# Patient Record
Sex: FEMALE | Race: BLACK OR AFRICAN AMERICAN | NOT HISPANIC OR LATINO | Employment: OTHER | URBAN - METROPOLITAN AREA
[De-identification: names, ages, dates, MRNs, and addresses within clinical notes are randomized per-mention and may not be internally consistent; named-entity substitution may affect disease eponyms.]

---

## 2019-10-08 ENCOUNTER — TELEPHONE (OUTPATIENT)
Dept: GASTROENTEROLOGY | Facility: CLINIC | Age: 69
End: 2019-10-08

## 2023-01-21 ENCOUNTER — HOSPITAL ENCOUNTER (EMERGENCY)
Facility: HOSPITAL | Age: 73
Discharge: HOME | End: 2023-01-21
Attending: EMERGENCY MEDICINE
Payer: MEDICARE

## 2023-01-21 VITALS
DIASTOLIC BLOOD PRESSURE: 76 MMHG | HEIGHT: 63 IN | WEIGHT: 293 LBS | HEART RATE: 66 BPM | SYSTOLIC BLOOD PRESSURE: 132 MMHG | TEMPERATURE: 99 F | OXYGEN SATURATION: 98 % | RESPIRATION RATE: 20 BRPM | BODY MASS INDEX: 51.91 KG/M2

## 2023-01-21 DIAGNOSIS — M79.673 FOOT PAIN: ICD-10-CM

## 2023-01-21 DIAGNOSIS — M25.569 KNEE PAIN: ICD-10-CM

## 2023-01-21 DIAGNOSIS — M47.816 OSTEOARTHRITIS OF LUMBAR SPINE, UNSPECIFIED SPINAL OSTEOARTHRITIS COMPLICATION STATUS: ICD-10-CM

## 2023-01-21 DIAGNOSIS — M17.11 OSTEOARTHRITIS OF RIGHT KNEE, UNSPECIFIED OSTEOARTHRITIS TYPE: ICD-10-CM

## 2023-01-21 DIAGNOSIS — M54.50 LOW BACK PAIN WITHOUT SCIATICA, UNSPECIFIED BACK PAIN LATERALITY, UNSPECIFIED CHRONICITY: Primary | ICD-10-CM

## 2023-01-21 PROCEDURE — 25000003 PHARM REV CODE 250: Mod: ER | Performed by: EMERGENCY MEDICINE

## 2023-01-21 PROCEDURE — 99284 EMERGENCY DEPT VISIT MOD MDM: CPT | Mod: ER

## 2023-01-21 RX ORDER — TRAMADOL HYDROCHLORIDE 50 MG/1
50 TABLET ORAL EVERY 8 HOURS PRN
Qty: 12 TABLET | Refills: 0 | Status: SHIPPED | OUTPATIENT
Start: 2023-01-21

## 2023-01-21 RX ORDER — ACETAMINOPHEN 500 MG
1000 TABLET ORAL
Status: COMPLETED | OUTPATIENT
Start: 2023-01-21 | End: 2023-01-21

## 2023-01-21 RX ADMIN — ACETAMINOPHEN 1000 MG: 500 TABLET, FILM COATED ORAL at 03:01

## 2023-01-22 NOTE — ED PROVIDER NOTES
Encounter Date: 1/21/2023       History     Chief Complaint   Patient presents with    Fall     Pt presents to the ED with C/C of fall. Pt reports R knee, foot, and back pain. Pt denies LOC or hittinf her head, is not on blood thinners. Pt has a pmhx of HTN     72-year-old female who is not keen on her past medical history who and chart review shows little states she had a mechanical slip and fall.  She states that her right knee chronically hurts her and gives out on her causing her to fall.  Denies being on blood thinners.  Denies hitting her head.  No loss conscious.  No neck pain.  She is complaining of right foot and low back pain.  These are also chronic but mildly exacerbated from the fall.  She was able to walk.  She walks slowly and uses a cane.  States she only takes Tylenol for pain but not taking Tylenol today.     Review of patient's allergies indicates:  No Known Allergies  No past medical history on file.  No past surgical history on file.  No family history on file.     Review of Systems   Constitutional:  Negative for fever.   HENT:  Negative for sore throat.    Eyes:  Negative for visual disturbance.   Respiratory:  Negative for shortness of breath.    Cardiovascular:  Negative for chest pain.   Gastrointestinal:  Negative for abdominal pain, nausea and vomiting.   Genitourinary:  Negative for difficulty urinating.   Musculoskeletal:  Positive for arthralgias and back pain. Negative for neck pain.   Skin:  Negative for rash.   Neurological:  Negative for headaches.     Physical Exam     Initial Vitals [01/21/23 1300]   BP Pulse Resp Temp SpO2   129/73 65 18 99 °F (37.2 °C) 98 %      MAP       --         Physical Exam    Nursing note and vitals reviewed.  Constitutional: She appears well-developed and well-nourished.   Eyes: EOM are normal. Pupils are equal, round, and reactive to light.   Neck: Neck supple. No thyromegaly present. No JVD present.   Normal range of motion.  Cardiovascular:  Normal  rate, regular rhythm, normal heart sounds and intact distal pulses.     Exam reveals no gallop and no friction rub.       No murmur heard.  Pulmonary/Chest: Breath sounds normal. No respiratory distress.   Abdominal: Abdomen is soft. Bowel sounds are normal.   Musculoskeletal:         General: No tenderness or edema. Normal range of motion.      Cervical back: Normal range of motion and neck supple.      Comments: Patient has significant arthritic changes to her knees right greater than left.  There is cogwheeling and rashing to the right knee.  No acute swelling, erythema, instability.  Patient is developing a Charcot deformity to her right foot.  This appears chronic.  There is callus to the fallen arch.  No acute swelling or bruising.  No midline back tenderness.  Patient appears to have full range of motion.     Neurological: She is alert and oriented to person, place, and time. She has normal strength. GCS score is 15. GCS eye subscore is 4. GCS verbal subscore is 5. GCS motor subscore is 6.   Patient has a left-sided facial droop and mild dysarthria.  She states this is chronic and unchanged however denies history of stroke.  She denies any new neurologic symptomatology.  Highly recommended to her that she have this followed up further by primary care to start.   Skin: Skin is warm and dry.       ED Course   Procedures  Labs Reviewed - No data to display       Imaging Results              X-Ray Knee 3 View Right (Final result)  Result time 01/21/23 14:58:23      Final result by Brian Ahmadi DO (01/21/23 14:58:23)                   Impression:      Please see above      Electronically signed by: Brian Ahmadi DO  Date:    01/21/2023  Time:    14:58               Narrative:    EXAMINATION:  XR KNEE 3 VIEW RIGHT    CLINICAL HISTORY:  Pain in unspecified knee    TECHNIQUE:  AP, lateral, and Merchant views of the right knee were performed.    COMPARISON:  None    FINDINGS:  Tricompartmental degenerative change  with joint space loss articular sclerosis and marginal osteophyte formation most pronounced in the femoral tibial compartments with advanced joint space loss.  There is no evidence for acute fracture line or dislocation right knee.  Questionable small suprapatellar bursa effusion.  Further evaluation as warranted clinically.                                       X-Ray Foot Complete Right (Final result)  Result time 01/21/23 15:00:23      Final result by Fracisco Jerome MD (01/21/23 15:00:23)                   Impression:      1. No acute displaced fracture or dislocation of the foot.      Electronically signed by: Fracisco Jerome MD  Date:    01/21/2023  Time:    15:00               Narrative:    EXAMINATION:  XR FOOT COMPLETE 3 VIEW RIGHT    CLINICAL HISTORY:  . Pain in unspecified foot    TECHNIQUE:  AP, lateral, and oblique views of the right foot were performed.    COMPARISON:  None    FINDINGS:  Three views right foot.    There is osteopenia.  There are degenerative changes of the foot.  No convincing acute displaced fracture or dislocation of the foot.  No radiopaque foreign body.                                       X-Ray Lumbar Spine Ap And Lateral (Final result)  Result time 01/21/23 14:59:49      Final result by Brian Ahmadi DO (01/21/23 14:59:49)                   Impression:      Please see above      Electronically signed by: Brian Ahmadi DO  Date:    01/21/2023  Time:    14:59               Narrative:    EXAMINATION:  XR LUMBAR SPINE AP AND LATERAL    CLINICAL HISTORY:  back pain;    TECHNIQUE:  AP, lateral and spot images were performed of the lumbar spine.    COMPARISON:  None    FINDINGS:  There is poor visualization L5 and S1 likely related to oblique positioning and overlapping structures allowing for this the lumbar sagittal alignment is within normal limits.  The visualized lumbar vertebral body heights and contours are within normal is allowing for endplate degeneration without  evidence for definite acute fracture or subluxation.  Prominent vascular calcifications and ectasia of the aorta.  Multiple surgical clips in the upper abdomen bilaterally.  Prominent gas and fecal material projected over the visualized colon and rectum.  Further evaluation as warranted clinically.                                       Medications   acetaminophen tablet 1,000 mg (1,000 mg Oral Given 1/21/23 1529)   X-rays to these areas shows severe degeneration and osteoarthritis without acute abnormalities.  Will refer to orthopedics.  Will also refer to primary care as for regular physicals and full medical evaluation.                           Clinical Impression:   Final diagnoses:  [M25.569] Knee pain  [M79.673] Foot pain  [M54.50] Low back pain without sciatica, unspecified back pain laterality, unspecified chronicity (Primary)  [M17.11] Osteoarthritis of right knee, unspecified osteoarthritis type  [M47.816] Osteoarthritis of lumbar spine, unspecified spinal osteoarthritis complication status        ED Disposition Condition    Discharge Stable          ED Prescriptions       Medication Sig Dispense Start Date End Date Auth. Provider    traMADoL (ULTRAM) 50 mg tablet Take 1 tablet (50 mg total) by mouth every 8 (eight) hours as needed (breakthrough pain). 12 tablet 1/21/2023 -- Eran Egan MD          Follow-up Information       Follow up With Specialties Details Why Contact Info    Gerald Silver MD Family Medicine, Wound Care Schedule an appointment as soon as possible for a visit  or your primary care doctor. [address] [facility] [address] [phone]      Michel Coffey MD Orthopedic Surgery Schedule an appointment as soon as possible for a visit  for knee and back evaluation. [address]  [facility] [address] [phone]               Eran Egan MD  01/22/23 0784

## 2025-01-12 ENCOUNTER — HOSPITAL ENCOUNTER (INPATIENT)
Facility: HOSPITAL | Age: 75
LOS: 4 days | Discharge: HOME | DRG: 871 | End: 2025-01-16
Attending: STUDENT IN AN ORGANIZED HEALTH CARE EDUCATION/TRAINING PROGRAM | Admitting: HOSPITALIST
Payer: MEDICARE

## 2025-01-12 DIAGNOSIS — R05.9 COUGH, UNSPECIFIED TYPE: ICD-10-CM

## 2025-01-12 DIAGNOSIS — J18.9 COMMUNITY ACQUIRED PNEUMONIA, UNSPECIFIED LATERALITY: Primary | ICD-10-CM

## 2025-01-12 DIAGNOSIS — A41.9 SEPSIS, DUE TO UNSPECIFIED ORGANISM, UNSPECIFIED WHETHER ACUTE ORGAN DYSFUNCTION PRESENT: ICD-10-CM

## 2025-01-12 DIAGNOSIS — I48.91 ATRIAL FIBRILLATION, UNSPECIFIED TYPE: ICD-10-CM

## 2025-01-12 DIAGNOSIS — R09.02 HYPOXIA: ICD-10-CM

## 2025-01-12 DIAGNOSIS — R06.02 SHORTNESS OF BREATH: ICD-10-CM

## 2025-01-12 DIAGNOSIS — J96.01 ACUTE HYPOXIC RESPIRATORY FAILURE: ICD-10-CM

## 2025-01-12 PROBLEM — D64.9 ANEMIA: Status: ACTIVE | Noted: 2025-01-12

## 2025-01-12 PROBLEM — E87.1 HYPONATREMIA: Status: ACTIVE | Noted: 2025-01-12

## 2025-01-12 PROBLEM — I10 BENIGN ESSENTIAL HYPERTENSION: Chronic | Status: ACTIVE | Noted: 2025-01-12

## 2025-01-12 PROBLEM — E78.5 HYPERLIPIDEMIA: Chronic | Status: ACTIVE | Noted: 2025-01-12

## 2025-01-12 LAB
ALBUMIN SERPL BCP-MCNC: 2.7 G/DL (ref 3.5–5.2)
ALLENS TEST: ABNORMAL
ALP SERPL-CCNC: 215 U/L (ref 40–150)
ALT SERPL W/O P-5'-P-CCNC: 45 U/L (ref 10–44)
ANION GAP SERPL CALC-SCNC: 11 MMOL/L (ref 8–16)
AST SERPL-CCNC: 43 U/L (ref 10–40)
BASOPHILS # BLD AUTO: 0.07 K/UL (ref 0–0.2)
BASOPHILS NFR BLD: 0.4 % (ref 0–1.9)
BILIRUB SERPL-MCNC: 0.4 MG/DL (ref 0.1–1)
BILIRUB UR QL STRIP: NEGATIVE
BNP SERPL-MCNC: 304 PG/ML (ref 0–99)
BUN SERPL-MCNC: 18 MG/DL (ref 8–23)
CALCIUM SERPL-MCNC: 8.7 MG/DL (ref 8.7–10.5)
CHLORIDE SERPL-SCNC: 97 MMOL/L (ref 95–110)
CLARITY UR: CLEAR
CO2 SERPL-SCNC: 23 MMOL/L (ref 23–29)
COLOR UR: YELLOW
CREAT SERPL-MCNC: 0.8 MG/DL (ref 0.5–1.4)
DELSYS: ABNORMAL
DIFFERENTIAL METHOD BLD: ABNORMAL
EOSINOPHIL # BLD AUTO: 0.1 K/UL (ref 0–0.5)
EOSINOPHIL NFR BLD: 0.3 % (ref 0–8)
ERYTHROCYTE [DISTWIDTH] IN BLOOD BY AUTOMATED COUNT: 12.9 % (ref 11.5–14.5)
EST. GFR  (NO RACE VARIABLE): >60 ML/MIN/1.73 M^2
FLOW: 2
GLUCOSE SERPL-MCNC: 142 MG/DL (ref 70–110)
GLUCOSE UR QL STRIP: NEGATIVE
HCO3 UR-SCNC: 30.1 MMOL/L (ref 24–28)
HCT VFR BLD AUTO: 29.7 % (ref 37–48.5)
HGB BLD-MCNC: 9.6 G/DL (ref 12–16)
HGB UR QL STRIP: NEGATIVE
IMM GRANULOCYTES # BLD AUTO: 0.19 K/UL (ref 0–0.04)
IMM GRANULOCYTES NFR BLD AUTO: 1 % (ref 0–0.5)
KETONES UR QL STRIP: NEGATIVE
LACTATE SERPL-SCNC: 1.1 MMOL/L (ref 0.5–2.2)
LEUKOCYTE ESTERASE UR QL STRIP: NEGATIVE
LYMPHOCYTES # BLD AUTO: 1.2 K/UL (ref 1–4.8)
LYMPHOCYTES NFR BLD: 6 % (ref 18–48)
MAGNESIUM SERPL-MCNC: 2.1 MG/DL (ref 1.6–2.6)
MCH RBC QN AUTO: 28.2 PG (ref 27–31)
MCHC RBC AUTO-ENTMCNC: 32.3 G/DL (ref 32–36)
MCV RBC AUTO: 87 FL (ref 82–98)
MODE: ABNORMAL
MONOCYTES # BLD AUTO: 1.1 K/UL (ref 0.3–1)
MONOCYTES NFR BLD: 5.6 % (ref 4–15)
NEUTROPHILS # BLD AUTO: 16.7 K/UL (ref 1.8–7.7)
NEUTROPHILS NFR BLD: 86.7 % (ref 38–73)
NITRITE UR QL STRIP: NEGATIVE
NRBC BLD-RTO: 0 /100 WBC
OHS QRS DURATION: 94 MS
OHS QTC CALCULATION: 472 MS
PCO2 BLDA: 49.5 MMHG (ref 35–45)
PH SMN: 7.39 [PH] (ref 7.35–7.45)
PH UR STRIP: 7 [PH] (ref 5–8)
PLATELET # BLD AUTO: 273 K/UL (ref 150–450)
PMV BLD AUTO: 9.9 FL (ref 9.2–12.9)
PO2 BLDA: 35 MMHG (ref 40–60)
POC BE: 4 MMOL/L
POC SATURATED O2: 65 % (ref 95–100)
POC TCO2: 32 MMOL/L (ref 24–29)
POCT GLUCOSE: 137 MG/DL (ref 70–110)
POCT GLUCOSE: 146 MG/DL (ref 70–110)
POTASSIUM SERPL-SCNC: 4.5 MMOL/L (ref 3.5–5.1)
PROT SERPL-MCNC: 7.6 G/DL (ref 6–8.4)
PROT UR QL STRIP: NEGATIVE
RBC # BLD AUTO: 3.4 M/UL (ref 4–5.4)
RSV AG SPEC QL IA: NEGATIVE
SAMPLE: ABNORMAL
SITE: ABNORMAL
SODIUM SERPL-SCNC: 131 MMOL/L (ref 136–145)
SP GR UR STRIP: 1.01 (ref 1–1.03)
SPECIMEN SOURCE: NORMAL
TROPONIN I SERPL DL<=0.01 NG/ML-MCNC: 0.02 NG/ML (ref 0–0.03)
URN SPEC COLLECT METH UR: NORMAL
UROBILINOGEN UR STRIP-ACNC: NEGATIVE EU/DL
WBC # BLD AUTO: 19.26 K/UL (ref 3.9–12.7)

## 2025-01-12 PROCEDURE — 87634 RSV DNA/RNA AMP PROBE: CPT | Performed by: PHYSICIAN ASSISTANT

## 2025-01-12 PROCEDURE — 82803 BLOOD GASES ANY COMBINATION: CPT

## 2025-01-12 PROCEDURE — 63600175 PHARM REV CODE 636 W HCPCS: Performed by: EMERGENCY MEDICINE

## 2025-01-12 PROCEDURE — 93005 ELECTROCARDIOGRAM TRACING: CPT

## 2025-01-12 PROCEDURE — 87040 BLOOD CULTURE FOR BACTERIA: CPT | Performed by: PHYSICIAN ASSISTANT

## 2025-01-12 PROCEDURE — 25000003 PHARM REV CODE 250: Performed by: PHYSICIAN ASSISTANT

## 2025-01-12 PROCEDURE — 21400001 HC TELEMETRY ROOM

## 2025-01-12 PROCEDURE — 81003 URINALYSIS AUTO W/O SCOPE: CPT | Performed by: PHYSICIAN ASSISTANT

## 2025-01-12 PROCEDURE — 63600175 PHARM REV CODE 636 W HCPCS: Performed by: HOSPITALIST

## 2025-01-12 PROCEDURE — 83605 ASSAY OF LACTIC ACID: CPT | Performed by: PHYSICIAN ASSISTANT

## 2025-01-12 PROCEDURE — 85025 COMPLETE CBC W/AUTO DIFF WBC: CPT | Performed by: PHYSICIAN ASSISTANT

## 2025-01-12 PROCEDURE — 25000003 PHARM REV CODE 250: Performed by: HOSPITALIST

## 2025-01-12 PROCEDURE — 11000001 HC ACUTE MED/SURG PRIVATE ROOM

## 2025-01-12 PROCEDURE — 94640 AIRWAY INHALATION TREATMENT: CPT

## 2025-01-12 PROCEDURE — 83880 ASSAY OF NATRIURETIC PEPTIDE: CPT | Performed by: PHYSICIAN ASSISTANT

## 2025-01-12 PROCEDURE — 93010 ELECTROCARDIOGRAM REPORT: CPT | Mod: ,,, | Performed by: INTERNAL MEDICINE

## 2025-01-12 PROCEDURE — 82962 GLUCOSE BLOOD TEST: CPT

## 2025-01-12 PROCEDURE — 83735 ASSAY OF MAGNESIUM: CPT | Performed by: PHYSICIAN ASSISTANT

## 2025-01-12 PROCEDURE — 63600175 PHARM REV CODE 636 W HCPCS: Performed by: PHYSICIAN ASSISTANT

## 2025-01-12 PROCEDURE — 80053 COMPREHEN METABOLIC PANEL: CPT | Performed by: PHYSICIAN ASSISTANT

## 2025-01-12 PROCEDURE — 99900035 HC TECH TIME PER 15 MIN (STAT)

## 2025-01-12 PROCEDURE — 87070 CULTURE OTHR SPECIMN AEROBIC: CPT | Performed by: HOSPITALIST

## 2025-01-12 PROCEDURE — 96365 THER/PROPH/DIAG IV INF INIT: CPT

## 2025-01-12 PROCEDURE — 96375 TX/PRO/DX INJ NEW DRUG ADDON: CPT

## 2025-01-12 PROCEDURE — 87205 SMEAR GRAM STAIN: CPT | Performed by: HOSPITALIST

## 2025-01-12 PROCEDURE — 99285 EMERGENCY DEPT VISIT HI MDM: CPT | Mod: 25

## 2025-01-12 PROCEDURE — 25500020 PHARM REV CODE 255: Performed by: STUDENT IN AN ORGANIZED HEALTH CARE EDUCATION/TRAINING PROGRAM

## 2025-01-12 PROCEDURE — 82800 BLOOD PH: CPT

## 2025-01-12 PROCEDURE — 25000003 PHARM REV CODE 250: Performed by: INTERNAL MEDICINE

## 2025-01-12 PROCEDURE — 84484 ASSAY OF TROPONIN QUANT: CPT | Performed by: PHYSICIAN ASSISTANT

## 2025-01-12 PROCEDURE — 25000003 PHARM REV CODE 250: Performed by: EMERGENCY MEDICINE

## 2025-01-12 PROCEDURE — 25000242 PHARM REV CODE 250 ALT 637 W/ HCPCS: Performed by: PHYSICIAN ASSISTANT

## 2025-01-12 RX ORDER — ACETAMINOPHEN 325 MG/1
650 TABLET ORAL EVERY 4 HOURS PRN
Status: DISCONTINUED | OUTPATIENT
Start: 2025-01-12 | End: 2025-01-16 | Stop reason: HOSPADM

## 2025-01-12 RX ORDER — IPRATROPIUM BROMIDE AND ALBUTEROL SULFATE 2.5; .5 MG/3ML; MG/3ML
3 SOLUTION RESPIRATORY (INHALATION) ONCE
Status: COMPLETED | OUTPATIENT
Start: 2025-01-12 | End: 2025-01-12

## 2025-01-12 RX ORDER — CEFTRIAXONE 2 G/1
2 INJECTION, POWDER, FOR SOLUTION INTRAMUSCULAR; INTRAVENOUS
Status: DISCONTINUED | OUTPATIENT
Start: 2025-01-12 | End: 2025-01-16 | Stop reason: HOSPADM

## 2025-01-12 RX ORDER — IPRATROPIUM BROMIDE AND ALBUTEROL SULFATE 2.5; .5 MG/3ML; MG/3ML
3 SOLUTION RESPIRATORY (INHALATION) EVERY 4 HOURS PRN
Status: DISCONTINUED | OUTPATIENT
Start: 2025-01-12 | End: 2025-01-14

## 2025-01-12 RX ORDER — BENZONATATE 100 MG/1
200 CAPSULE ORAL 3 TIMES DAILY PRN
Status: DISCONTINUED | OUTPATIENT
Start: 2025-01-12 | End: 2025-01-16 | Stop reason: HOSPADM

## 2025-01-12 RX ORDER — OLMESARTAN MEDOXOMIL 40 MG/1
40 TABLET ORAL DAILY
COMMUNITY

## 2025-01-12 RX ORDER — HYDROCHLOROTHIAZIDE 25 MG/1
25 TABLET ORAL DAILY
COMMUNITY

## 2025-01-12 RX ORDER — NIFEDIPINE 30 MG/1
30 TABLET, EXTENDED RELEASE ORAL DAILY
Status: DISCONTINUED | OUTPATIENT
Start: 2025-01-12 | End: 2025-01-16 | Stop reason: HOSPADM

## 2025-01-12 RX ORDER — SODIUM CHLORIDE 0.9 % (FLUSH) 0.9 %
10 SYRINGE (ML) INJECTION EVERY 12 HOURS PRN
Status: DISCONTINUED | OUTPATIENT
Start: 2025-01-12 | End: 2025-01-16 | Stop reason: HOSPADM

## 2025-01-12 RX ORDER — GUAIFENESIN AND DEXTROMETHORPHAN HYDROBROMIDE 10; 100 MG/5ML; MG/5ML
5 SYRUP ORAL EVERY 6 HOURS
Status: DISCONTINUED | OUTPATIENT
Start: 2025-01-12 | End: 2025-01-16 | Stop reason: HOSPADM

## 2025-01-12 RX ORDER — ASPIRIN 81 MG/1
81 TABLET ORAL DAILY
Status: DISCONTINUED | OUTPATIENT
Start: 2025-01-12 | End: 2025-01-16 | Stop reason: HOSPADM

## 2025-01-12 RX ORDER — ONDANSETRON HYDROCHLORIDE 2 MG/ML
8 INJECTION, SOLUTION INTRAVENOUS EVERY 6 HOURS PRN
Status: DISCONTINUED | OUTPATIENT
Start: 2025-01-12 | End: 2025-01-16 | Stop reason: HOSPADM

## 2025-01-12 RX ORDER — TRAMADOL HYDROCHLORIDE 50 MG/1
50 TABLET ORAL EVERY 8 HOURS PRN
Status: DISCONTINUED | OUTPATIENT
Start: 2025-01-12 | End: 2025-01-16 | Stop reason: HOSPADM

## 2025-01-12 RX ORDER — CARVEDILOL 12.5 MG/1
25 TABLET ORAL 2 TIMES DAILY WITH MEALS
Status: DISCONTINUED | OUTPATIENT
Start: 2025-01-12 | End: 2025-01-16 | Stop reason: HOSPADM

## 2025-01-12 RX ORDER — PROCHLORPERAZINE EDISYLATE 5 MG/ML
5 INJECTION INTRAMUSCULAR; INTRAVENOUS EVERY 6 HOURS PRN
Status: DISCONTINUED | OUTPATIENT
Start: 2025-01-12 | End: 2025-01-16 | Stop reason: HOSPADM

## 2025-01-12 RX ORDER — VALSARTAN 80 MG/1
160 TABLET ORAL DAILY
Status: DISCONTINUED | OUTPATIENT
Start: 2025-01-12 | End: 2025-01-16 | Stop reason: HOSPADM

## 2025-01-12 RX ORDER — HYDRALAZINE HYDROCHLORIDE 20 MG/ML
10 INJECTION INTRAMUSCULAR; INTRAVENOUS EVERY 8 HOURS PRN
Status: DISCONTINUED | OUTPATIENT
Start: 2025-01-12 | End: 2025-01-16 | Stop reason: HOSPADM

## 2025-01-12 RX ORDER — TALC
6 POWDER (GRAM) TOPICAL NIGHTLY PRN
Status: DISCONTINUED | OUTPATIENT
Start: 2025-01-12 | End: 2025-01-16 | Stop reason: HOSPADM

## 2025-01-12 RX ORDER — POTASSIUM CHLORIDE 750 MG/1
10 TABLET, EXTENDED RELEASE ORAL ONCE
COMMUNITY

## 2025-01-12 RX ORDER — POLYETHYLENE GLYCOL 3350 17 G/17G
17 POWDER, FOR SOLUTION ORAL 2 TIMES DAILY PRN
Status: DISCONTINUED | OUTPATIENT
Start: 2025-01-12 | End: 2025-01-16 | Stop reason: HOSPADM

## 2025-01-12 RX ORDER — HYDROCODONE BITARTRATE AND ACETAMINOPHEN 5; 325 MG/1; MG/1
1 TABLET ORAL EVERY 6 HOURS PRN
Status: DISCONTINUED | OUTPATIENT
Start: 2025-01-12 | End: 2025-01-12

## 2025-01-12 RX ORDER — LEVOFLOXACIN 5 MG/ML
750 INJECTION, SOLUTION INTRAVENOUS
Status: COMPLETED | OUTPATIENT
Start: 2025-01-12 | End: 2025-01-12

## 2025-01-12 RX ORDER — NIFEDIPINE 90 MG/1
30 TABLET, EXTENDED RELEASE ORAL DAILY
COMMUNITY

## 2025-01-12 RX ORDER — ZIPRASIDONE HYDROCHLORIDE 20 MG/1
80 CAPSULE ORAL NIGHTLY
Status: DISCONTINUED | OUTPATIENT
Start: 2025-01-12 | End: 2025-01-16 | Stop reason: HOSPADM

## 2025-01-12 RX ORDER — ASPIRIN 81 MG/1
81 TABLET ORAL DAILY
COMMUNITY

## 2025-01-12 RX ORDER — ENOXAPARIN SODIUM 100 MG/ML
40 INJECTION SUBCUTANEOUS EVERY 24 HOURS
Status: DISCONTINUED | OUTPATIENT
Start: 2025-01-12 | End: 2025-01-16 | Stop reason: HOSPADM

## 2025-01-12 RX ORDER — CARVEDILOL 25 MG/1
25 TABLET ORAL 2 TIMES DAILY WITH MEALS
COMMUNITY

## 2025-01-12 RX ADMIN — AZITHROMYCIN DIHYDRATE 500 MG: 500 INJECTION, POWDER, LYOPHILIZED, FOR SOLUTION INTRAVENOUS at 01:01

## 2025-01-12 RX ADMIN — GUAIFENESIN AND DEXTROMETHORPHAN 5 ML: 100; 10 SYRUP ORAL at 11:01

## 2025-01-12 RX ADMIN — IOHEXOL 75 ML: 350 INJECTION, SOLUTION INTRAVENOUS at 07:01

## 2025-01-12 RX ADMIN — VALSARTAN 160 MG: 80 TABLET, FILM COATED ORAL at 10:01

## 2025-01-12 RX ADMIN — ENOXAPARIN SODIUM 40 MG: 40 INJECTION SUBCUTANEOUS at 04:01

## 2025-01-12 RX ADMIN — SODIUM CHLORIDE, POTASSIUM CHLORIDE, SODIUM LACTATE AND CALCIUM CHLORIDE 500 ML: 600; 310; 30; 20 INJECTION, SOLUTION INTRAVENOUS at 06:01

## 2025-01-12 RX ADMIN — IPRATROPIUM BROMIDE AND ALBUTEROL SULFATE 3 ML: 2.5; .5 SOLUTION RESPIRATORY (INHALATION) at 06:01

## 2025-01-12 RX ADMIN — ASPIRIN 81 MG: 81 TABLET, COATED ORAL at 10:01

## 2025-01-12 RX ADMIN — LEVOFLOXACIN 750 MG: 750 INJECTION, SOLUTION INTRAVENOUS at 08:01

## 2025-01-12 RX ADMIN — VANCOMYCIN HYDROCHLORIDE 1750 MG: 500 INJECTION, POWDER, LYOPHILIZED, FOR SOLUTION INTRAVENOUS at 09:01

## 2025-01-12 RX ADMIN — CARVEDILOL 25 MG: 12.5 TABLET, FILM COATED ORAL at 04:01

## 2025-01-12 RX ADMIN — GUAIFENESIN AND DEXTROMETHORPHAN 5 ML: 100; 10 SYRUP ORAL at 05:01

## 2025-01-12 RX ADMIN — NIFEDIPINE 30 MG: 30 TABLET, FILM COATED, EXTENDED RELEASE ORAL at 10:01

## 2025-01-12 RX ADMIN — GUAIFENESIN AND DEXTROMETHORPHAN 5 ML: 100; 10 SYRUP ORAL at 01:01

## 2025-01-12 RX ADMIN — PIPERACILLIN SODIUM AND TAZOBACTAM SODIUM 4.5 G: 4; .5 INJECTION, POWDER, FOR SOLUTION INTRAVENOUS at 07:01

## 2025-01-12 RX ADMIN — CEFTRIAXONE 2 G: 2 INJECTION, POWDER, FOR SOLUTION INTRAMUSCULAR; INTRAVENOUS at 01:01

## 2025-01-12 RX ADMIN — ZIPRASIDONE HYDROCHLORIDE 80 MG: 20 CAPSULE ORAL at 11:01

## 2025-01-12 NOTE — ASSESSMENT & PLAN NOTE
Hyponatremia is likely due to Dehydration/hypovolemia. The patient's most recent sodium results are listed below.  Recent Labs     01/12/25  0519   *     Plan  - Correct the sodium by 4-6mEq in 24 hours.   - Will treat the hyponatremia with IVF's  - Monitor sodium Daily.

## 2025-01-12 NOTE — SUBJECTIVE & OBJECTIVE
No past medical history on file.    No past surgical history on file.    Review of patient's allergies indicates:  No Known Allergies    No current facility-administered medications on file prior to encounter.     Current Outpatient Medications on File Prior to Encounter   Medication Sig    aspirin (ECOTRIN) 81 MG EC tablet Take 81 mg by mouth once daily.    carvediloL (COREG) 25 MG tablet Take 25 mg by mouth 2 (two) times daily with meals.    hydroCHLOROthiazide (HYDRODIURIL) 25 MG tablet Take 25 mg by mouth once daily.    NIFEdipine (ADALAT CC) 90 MG TbSR Take 30 mg by mouth once daily.    olmesartan (BENICAR) 40 MG tablet Take 40 mg by mouth once daily.    potassium chloride (KLOR-CON) 10 MEQ TbSR Take 10 mEq by mouth once.    traMADoL (ULTRAM) 50 mg tablet Take 1 tablet (50 mg total) by mouth every 8 (eight) hours as needed (breakthrough pain).     Family History    None       Tobacco Use    Smoking status: Not on file    Smokeless tobacco: Not on file   Substance and Sexual Activity    Alcohol use: Not on file    Drug use: Not on file    Sexual activity: Not on file     Review of Systems   Constitutional:  Positive for fatigue. Negative for fever.   HENT:  Negative for ear discharge and ear pain.    Eyes:  Negative for discharge and itching.   Respiratory:  Positive for cough and shortness of breath.    Cardiovascular:  Negative for chest pain and palpitations.   Gastrointestinal:  Positive for abdominal pain and diarrhea.   Endocrine: Negative for cold intolerance and heat intolerance.   Genitourinary:  Negative for difficulty urinating and hematuria.   Musculoskeletal:  Negative for neck pain and neck stiffness.   Skin:  Negative for rash and wound.   Neurological:  Negative for seizures and syncope.   Psychiatric/Behavioral:  Negative for agitation and hallucinations.      Objective:     Vital Signs (Most Recent):  Temp: 98.1 °F (36.7 °C) (01/12/25 1131)  Pulse: 85 (01/12/25 1131)  Resp: 18 (01/12/25  1131)  BP: (!) 165/72 (01/12/25 1131)  SpO2: (!) 90 % (01/12/25 1131) Vital Signs (24h Range):  Temp:  [98.1 °F (36.7 °C)-99 °F (37.2 °C)] 98.1 °F (36.7 °C)  Pulse:  [] 85  Resp:  [16-33] 18  SpO2:  [90 %-100 %] 90 %  BP: (123-165)/(72-77) 165/72     Weight: 91.5 kg (201 lb 11.5 oz)  Body mass index is 31.59 kg/m².     Physical Exam  Constitutional:       General: She is not in acute distress.     Appearance: She is ill-appearing.   HENT:      Head: Normocephalic and atraumatic.      Mouth/Throat:      Mouth: Mucous membranes are dry.      Pharynx: No oropharyngeal exudate or posterior oropharyngeal erythema.   Cardiovascular:      Rate and Rhythm: Regular rhythm. Tachycardia present.   Pulmonary:      Effort: Pulmonary effort is normal.      Breath sounds: Rales present.   Abdominal:      General: Bowel sounds are normal.      Palpations: Abdomen is soft.   Musculoskeletal:         General: No deformity or signs of injury.   Skin:     General: Skin is warm and dry.   Neurological:      General: No focal deficit present.      Mental Status: Mental status is at baseline.                Significant Labs: All pertinent labs within the past 24 hours have been reviewed.  BMP:   Recent Labs   Lab 01/12/25  0519   *   *   K 4.5   CL 97   CO2 23   BUN 18   CREATININE 0.8   CALCIUM 8.7   MG 2.1     CBC:   Recent Labs   Lab 01/12/25  0519   WBC 19.26*   HGB 9.6*   HCT 29.7*          Significant Imaging: I have reviewed all pertinent imaging results/findings within the past 24 hours.

## 2025-01-12 NOTE — H&P
[facility] Medicine  History & Physical    Patient Name: Vandana López  MRN: [id number]  Patient Class: IP- Inpatient  Admission Date: 1/12/2025  Attending Physician: Jose Goldsmith MD   Primary Care Provider: Alfreda Primary Doctor         Patient information was obtained from patient and ER records.     Subjective:     Principal Problem:Sepsis    Chief Complaint:   Chief Complaint   Patient presents with    Shortness of Breath     BIB [facility] for SOB that started sat, given duo neb by EMS and pts reports improvement.         HPI: 75 y/o female with past medical history of HTN, HLP presents with shortness of breath.  Patient complains of 3 days of shortness of breath with associated cough, congestion and wheezing.  She also complains of diarrhea.  States multiple grandchildren with URI symptoms, another one of her grand children with diarrhea.  She was experiencing some associated right-sided abdominal pain, which has resolved. Patient states decreased appetite and intake since onset of symptoms.  Some improvement of wheezing with inhaler, but no improvement of shortness of breath.  Dyspnea is both at rest and on exertion.  No alleviating factors.  Denies any fever or chills.  No other complaints.    No past medical history on file.    No past surgical history on file.    Review of patient's allergies indicates:  No Known Allergies    No current facility-administered medications on file prior to encounter.     Current Outpatient Medications on File Prior to Encounter   Medication Sig    aspirin (ECOTRIN) 81 MG EC tablet Take 81 mg by mouth once daily.    carvediloL (COREG) 25 MG tablet Take 25 mg by mouth 2 (two) times daily with meals.    hydroCHLOROthiazide (HYDRODIURIL) 25 MG tablet Take 25 mg by mouth once daily.    NIFEdipine (ADALAT CC) 90 MG TbSR Take 30 mg by mouth once daily.    olmesartan (BENICAR) 40 MG tablet Take 40 mg by mouth once daily.    potassium chloride (KLOR-CON) 10 MEQ  TbSR Take 10 mEq by mouth once.    traMADoL (ULTRAM) 50 mg tablet Take 1 tablet (50 mg total) by mouth every 8 (eight) hours as needed (breakthrough pain).     Family History    None       Tobacco Use    Smoking status: Not on file    Smokeless tobacco: Not on file   Substance and Sexual Activity    Alcohol use: Not on file    Drug use: Not on file    Sexual activity: Not on file     Review of Systems   Constitutional:  Positive for fatigue. Negative for fever.   HENT:  Negative for ear discharge and ear pain.    Eyes:  Negative for discharge and itching.   Respiratory:  Positive for cough and shortness of breath.    Cardiovascular:  Negative for chest pain and palpitations.   Gastrointestinal:  Positive for abdominal pain and diarrhea.   Endocrine: Negative for cold intolerance and heat intolerance.   Genitourinary:  Negative for difficulty urinating and hematuria.   Musculoskeletal:  Negative for neck pain and neck stiffness.   Skin:  Negative for rash and wound.   Neurological:  Negative for seizures and syncope.   Psychiatric/Behavioral:  Negative for agitation and hallucinations.      Objective:     Vital Signs (Most Recent):  Temp: 98.1 °F (36.7 °C) (01/12/25 1131)  Pulse: 85 (01/12/25 1131)  Resp: 18 (01/12/25 1131)  BP: (!) 165/72 (01/12/25 1131)  SpO2: (!) 90 % (01/12/25 1131) Vital Signs (24h Range):  Temp:  [98.1 °F (36.7 °C)-99 °F (37.2 °C)] 98.1 °F (36.7 °C)  Pulse:  [] 85  Resp:  [16-33] 18  SpO2:  [90 %-100 %] 90 %  BP: (123-165)/(72-77) 165/72     Weight: 91.5 kg (201 lb 11.5 oz)  Body mass index is 31.59 kg/m².     Physical Exam  Constitutional:       General: She is not in acute distress.     Appearance: She is ill-appearing.   HENT:      Head: Normocephalic and atraumatic.      Mouth/Throat:      Mouth: Mucous membranes are dry.      Pharynx: No oropharyngeal exudate or posterior oropharyngeal erythema.   Cardiovascular:      Rate and Rhythm: Regular rhythm. Tachycardia present.   Pulmonary:       Effort: Pulmonary effort is normal.      Breath sounds: Rales present.   Abdominal:      General: Bowel sounds are normal.      Palpations: Abdomen is soft.   Musculoskeletal:         General: No deformity or signs of injury.   Skin:     General: Skin is warm and dry.   Neurological:      General: No focal deficit present.      Mental Status: Mental status is at baseline.                Significant Labs: All pertinent labs within the past 24 hours have been reviewed.  BMP:   Recent Labs   Lab 01/12/25  0519   *   *   K 4.5   CL 97   CO2 23   BUN 18   CREATININE 0.8   CALCIUM 8.7   MG 2.1     CBC:   Recent Labs   Lab 01/12/25  0519   WBC 19.26*   HGB 9.6*   HCT 29.7*          Significant Imaging: I have reviewed all pertinent imaging results/findings within the past 24 hours.  Assessment/Plan:     * Sepsis  This patient does have evidence of infective focus  My overall impression is sepsis.  Source: Respiratory  Antibiotics given-   Antibiotics (72h ago, onward)      Start     Stop Route Frequency Ordered    01/12/25 1030  cefTRIAXone injection 2 g  (Community Acquired Pneumonia (CAP) - Low MDR Risk)         01/18/25 1029 IV Every 24 hours (non-standard times) 01/12/25 0923    01/12/25 1030  azithromycin (ZITHROMAX) 500 mg in 0.9% NaCl 250 mL IVPB (admixture device)  (Community Acquired Pneumonia (CAP) - Low MDR Risk)         01/17/25 1029 IV Every 24 hours (non-standard times) 01/12/25 0923          Latest lactate reviewed-  Recent Labs   Lab 01/12/25  0612   LACTATE 1.1     Fluid challenge Actual Body weight- Patient will receive 30ml/kg actual body weight to calculate fluid bolus for treatment of septic shock.     Post- resuscitation assessment Yes Perfusion exam was performed within 6 hours of septic shock presentation after bolus shows Adequate tissue perfusion assessed by non-invasive monitoring     Source control achieved by: IV ABX's.  Check sputum and blood  cultures.    Hyperlipidemia  Does not appear to be on statin, most likely related to elevated LFT's.      Hyponatremia  Hyponatremia is likely due to Dehydration/hypovolemia. The patient's most recent sodium results are listed below.  Recent Labs     01/12/25  0519   *     Plan  - Correct the sodium by 4-6mEq in 24 hours.   - Will treat the hyponatremia with IVF's  - Monitor sodium Daily.       Anemia  Anemia is likely due to  unsure etiology, but seems to be chronic and normocytic . Most recent hemoglobin and hematocrit are listed below.  Recent Labs     01/12/25  0519   HGB 9.6*   HCT 29.7*     Plan  - Monitor serial CBC: Daily  - Transfuse PRBC if patient becomes hemodynamically unstable, symptomatic or H/H drops below 7/21.  - Patient has not received any PRBC transfusions to date      Benign essential hypertension  Patient's blood pressure range in the last 24 hours was: BP  Min: 123/76  Max: 165/72.The patient's inpatient anti-hypertensive regimen is listed below:  Current Antihypertensives  hydrALAZINE injection 10 mg, Every 8 hours PRN, Intravenous  , Daily, Oral  , Daily, Oral  , Daily, Oral  , 2 times daily with meals, Oral  carvediloL tablet 25 mg, 2 times daily with meals, Oral  NIFEdipine 24 hr tablet 30 mg, Daily, Oral  valsartan tablet 160 mg, Daily, Oral    Plan  - BP is controlled, no changes needed to their regimen    Pneumonia  Patient has a diagnosis of pneumonia. The cause of the pneumonia is suspected to be bacterial in etiology but organism is not known. The pneumonia is stable. The patient has the following signs/symptoms of pneumonia: cough and shortness of breath. The patient does have a current oxygen requirement and the patient does not have a home oxygen requirement. I have reviewed the pertinent imaging. The following cultures have been collected: Blood cultures and Sputum culture The culture results are listed below.     Current antimicrobial regimen consists of the antibiotics  listed below. Will monitor patient closely and continue current treatment plan unchanged.    Antibiotics (From admission, onward)      Start     Stop Route Frequency Ordered    01/12/25 1030  cefTRIAXone injection 2 g  (Community Acquired Pneumonia (CAP) - Low MDR Risk)         01/18/25 1029 IV Every 24 hours (non-standard times) 01/12/25 0923    01/12/25 1030  azithromycin (ZITHROMAX) 500 mg in 0.9% NaCl 250 mL IVPB (admixture device)  (Community Acquired Pneumonia (CAP) - Low MDR Risk)         01/17/25 1029 IV Every 24 hours (non-standard times) 01/12/25 0923            Microbiology Results (last 7 days)       Procedure Component Value Units Date/Time    Culture, Respiratory with Gram Stain [[id number]]     Order Status: No result Specimen: Respiratory     Blood Culture #1 **CANNOT BE ORDERED STAT** [[id number]] Collected: 01/12/25 0651    Order Status: Sent Specimen: Blood from Peripheral, Hand, Left Updated: 01/12/25 0716    Blood Culture #2 **CANNOT BE ORDERED STAT** [[id number]] Collected: 01/12/25 0653    Order Status: Sent Specimen: Blood from Peripheral, Hand, Right Updated: 01/12/25 0716              VTE Risk Mitigation (From admission, onward)           Ordered     enoxaparin injection 40 mg  Daily         01/12/25 0923     IP VTE HIGH RISK PATIENT  Once         01/12/25 0923     Place sequential compression device  Until discontinued         01/12/25 0923                               Therapy with vancomycin completed and/or consult discontinued by provider.  Pharmacy will sign off, please re-consult as needed.         Jose Goldsmith MD  Department of Hospital Medicine  [facility]

## 2025-01-12 NOTE — HPI
73 y/o female with past medical history of HTN, HLP presents with shortness of breath.  Patient complains of 3 days of shortness of breath with associated cough, congestion and wheezing.  She also complains of diarrhea.  States multiple grandchildren with URI symptoms, another one of her grand children with diarrhea.  She was experiencing some associated right-sided abdominal pain, which has resolved. Patient states decreased appetite and intake since onset of symptoms.  Some improvement of wheezing with inhaler, but no improvement of shortness of breath.  Dyspnea is both at rest and on exertion.  No alleviating factors.  Denies any fever or chills.  No other complaints.

## 2025-01-12 NOTE — ASSESSMENT & PLAN NOTE
Patient has a diagnosis of pneumonia. The cause of the pneumonia is suspected to be bacterial in etiology but organism is not known. The pneumonia is stable. The patient has the following signs/symptoms of pneumonia: cough and shortness of breath. The patient does have a current oxygen requirement and the patient does not have a home oxygen requirement. I have reviewed the pertinent imaging. The following cultures have been collected: Blood cultures and Sputum culture The culture results are listed below.     Current antimicrobial regimen consists of the antibiotics listed below. Will monitor patient closely and continue current treatment plan unchanged.    Antibiotics (From admission, onward)      Start     Stop Route Frequency Ordered    01/12/25 1030  cefTRIAXone injection 2 g  (Community Acquired Pneumonia (CAP) - Low MDR Risk)         01/18/25 1029 IV Every 24 hours (non-standard times) 01/12/25 0923    01/12/25 1030  azithromycin (ZITHROMAX) 500 mg in 0.9% NaCl 250 mL IVPB (admixture device)  (Community Acquired Pneumonia (CAP) - Low MDR Risk)         01/17/25 1029 IV Every 24 hours (non-standard times) 01/12/25 0923            Microbiology Results (last 7 days)       Procedure Component Value Units Date/Time    Culture, Respiratory with Gram Stain [[id number]]     Order Status: No result Specimen: Respiratory     Blood Culture #1 **CANNOT BE ORDERED STAT** [[id number]] Collected: 01/12/25 0651    Order Status: Sent Specimen: Blood from Peripheral, Hand, Left Updated: 01/12/25 0716    Blood Culture #2 **CANNOT BE ORDERED STAT** [[id number]] Collected: 01/12/25 0653    Order Status: Sent Specimen: Blood from Peripheral, Hand, Right Updated: 01/12/25 0716

## 2025-01-12 NOTE — PROGRESS NOTES
"Pharmacokinetic Initial Assessment: IV Vancomycin    Assessment/Plan:    Initiate intravenous vancomycin with loading dose of 1750 mg once followed by a maintenance dose of vancomycin 1000 mg IV every 12 hours  Desired empiric serum trough concentration is 10 to 20 mcg/mL  Draw vancomycin trough level 60 min prior to fourth dose on 1/13 at approximately 2100  Pharmacy will continue to follow and monitor vancomycin.      Please contact pharmacy at extension [phone] with any questions regarding this assessment.     Thank you for the consult,   Dinh Covarrubias       Patient brief summary:  Vandana López is a 74 y.o. female initiated on antimicrobial therapy with IV Vancomycin for treatment of suspected  pneumonia    Drug Allergies:   Review of patient's allergies indicates:  No Known Allergies    Actual Body Weight:   92 kg    Renal Function:   Estimated Creatinine Clearance: 70.6 mL/min (based on SCr of 0.8 mg/dL).,     Dialysis Method (if applicable):  N/A    CBC (last 72 hours):  Recent Labs   Lab Result Units 01/12/25  0519   WBC K/uL 19.26*   Hemoglobin g/dL 9.6*   Hematocrit % 29.7*   Platelets K/uL 273   Gran % % 86.7*   Lymph % % 6.0*   Mono % % 5.6   Eosinophil % % 0.3   Basophil % % 0.4   Differential Method  Automated       Metabolic Panel (last 72 hours):  Recent Labs   Lab Result Units 01/12/25  0519   Sodium mmol/L 131*   Potassium mmol/L 4.5   Chloride mmol/L 97   CO2 mmol/L 23   Glucose mg/dL 142*   BUN mg/dL 18   Creatinine mg/dL 0.8   Albumin g/dL 2.7*   Total Bilirubin mg/dL 0.4   Alkaline Phosphatase U/L 215*   AST U/L 43*   ALT U/L 45*   Magnesium mg/dL 2.1       Drug levels (last 3 results):  No results for input(s): "VANCOMYCINRA", "VANCORANDOM", "VANCOMYCINPE", "VANCOPEAK", "VANCOMYCINTR", "VANCOTROUGH" in the last 72 hours.    Microbiologic Results:  Microbiology Results (last 7 days)       Procedure Component Value Units Date/Time    Blood Culture #1 **CANNOT BE ORDERED STAT** " [[id number]] Collected: 01/12/25 0651    Order Status: Sent Specimen: Blood from Peripheral, Hand, Left Updated: 01/12/25 0716    Blood Culture #2 **CANNOT BE ORDERED STAT** [[id number]] Collected: 01/12/25 0653    Order Status: Sent Specimen: Blood from Peripheral, Hand, Right Updated: 01/12/25 0716

## 2025-01-12 NOTE — ASSESSMENT & PLAN NOTE
Anemia is likely due to  unsure etiology, but seems to be chronic and normocytic . Most recent hemoglobin and hematocrit are listed below.  Recent Labs     01/12/25  0519   HGB 9.6*   HCT 29.7*     Plan  - Monitor serial CBC: Daily  - Transfuse PRBC if patient becomes hemodynamically unstable, symptomatic or H/H drops below 7/21.  - Patient has not received any PRBC transfusions to date

## 2025-01-12 NOTE — PLAN OF CARE
Case Management Assessment     PCP: [facility]  Pharmacy:   [facility] - [place], [place] - [address]  [address]  [place] [address]  Phone: [phone] Fax: [phone]     Patient Arrived From: Home with daughter  Existing Help at Home: Marlene-daughter  Barriers to Discharge: None    Discharge Plan:    A. Home with family; follow-up   B. TBD    Final diagnoses:  [R06.02] Shortness of breath  [J18.9] Community acquired pneumonia, unspecified laterality (Primary)  [R09.02] Hypoxia  [R05.9] Cough, unspecified type  [I48.91] Atrial fibrillation, unspecified type  [A41.9] Sepsis, due to unspecified organism, unspecified whether acute organ dysfunction present     Independent at home with daughter, who assists if needed; no assist needed; no current medical services; uses RW and cane; no significant discharge needs anticipated at this time.         01/12/25 1247   Discharge Assessment   Assessment Type Discharge Planning Assessment   Confirmed/corrected address, phone number and insurance Yes   Confirmed Demographics Correct on Facesheet   Source of Information patient;health record   Communicated IRVIN with patient/caregiver Date not available/Unable to determine   Reason For Admission Community acquired pneumonia   People in Home child(sanjuana), adult   Facility Arrived From: Home   Do you expect to return to your current living situation? Yes   Do you have help at home or someone to help you manage your care at home? Yes   Who are your caregiver(s) and their phone number(s)? Marlene-daughter: [phone]   Prior to hospitilization cognitive status: Alert/Oriented   Current cognitive status: Alert/Oriented   Walking or Climbing Stairs Difficulty yes   Walking or Climbing Stairs ambulation difficulty, requires equipment   Mobility Management RW, cane   Dressing/Bathing Difficulty no   Do you have any problems with: Errands/Grocery;Needs other help    Specify other help Daughter assists as needed and assits with transport   Home Accessibility wheelchair accessible   Home Layout Able to live on 1st floor   Equipment Currently Used at Home walker, rolling;cane, straight   Readmission within 30 days? No   Patient currently being followed by outpatient case management? No   Do you currently have service(s) that help you manage your care at home? No   Do you take prescription medications? Yes   Do you have prescription coverage? Yes   Coverage PHN   Do you have any problems affording any of your prescribed medications? No   Is the patient taking medications as prescribed? yes   Who is going to help you get home at discharge? Estheladaughter   How do you get to doctors appointments? family or friend will provide;public transportation;health plan transportation   Are you on dialysis? No   Do you take coumadin? No   Discharge Plan A Home with family  (Follow-ups)   Discharge Plan B Other  (TBD)   DME Needed Upon Discharge  other (see comments)  (TBD)   Discharge Plan discussed with: Patient   Transition of Care Barriers None   OTHER   Name(s) of People in Home Esthelademi CASTORENA Role explained to patient; two patient identifiers recognized; RAFFAELE contact information placed on Communication board. Discussed patient managing health care at home and discussed discharge plans A and B; determined who would be helping patient at home with recovery: Rush, will help with recovery at home.

## 2025-01-12 NOTE — ED PROVIDER NOTES
Encounter Date: 1/12/2025       History     Chief Complaint   Patient presents with    Shortness of Breath     BIB [facility] for SOB that started sat, given duo neb by EMS and pts reports improvement.      73yo F presents to ED via EMS due to shortness of breath.    Patient states began on Thursday with cough, congestion, wheezing, associated shortness of breath.  Admits to frequent watery loose stools since Thursday as well.  States multiple grandchildren with URI symptoms, another one of her grand children with diarrhea.  She states that the diarrhea has somewhat improved.  States she was experiencing some associated right-sided abdominal pain, which has resolved.  Denies any current abdominal pain.  Patient states decreased appetite and intake since onset of symptoms.  Admits to oliguria.  No nausea vomiting.  She denies fever, chills, myalgias.  She admits to persistent cough and shortness of breath.  Does admit to relief of wheezing with rescue inhaler.  She does admit to bilateral lower extremity edema, worse than usual.  She admits to chronic orthopnea, worsened since onset of current complaints.  Also complaining of difficulty sleeping, possible paroxysmal nocturnal dyspnea.  She denies chest pain. Denies history of ACS.  She denies history of CHF.  No exogenous estrogen.  Denies history of VTE.  No unilateral leg swelling or calf pain.  Given DuoNeb EN route with improvement of wheezing or shortness of breath tonight.  Symptoms are acute, constant, moderate.  No exacerbating factors.  No radiation of symptoms.    Typically ambulates with cane or walker assist.  Admits to decreased activity, difficulty with ambulation due to shortness of breath recently.    Does not wear home O2  States she has been compliant with all of her medications.      PMH:  Essential hypertension  Hyperlipidemia  COCO  CKD 2  Normal steady anemia  Osteoarthritis  Glaucoma  Morbid obesity  GERD  Peptic ulcer disease  Bipolar disorder  History  of suicide attempt  Prediabetes        TTE 1/6/21:  Summary:   1. Normal left ventricular systolic function. LVEF of > 55%.   2. LV diastolic function is indeterminate.   3. Normal left ventricular strain (-18.8 %).   4. Mild to moderate mitral valve regurgitation.   5. Normal right ventricular systolic function.   6. Moderate tricuspid regurgitation.      Review of patient's allergies indicates:  No Known Allergies  No past medical history on file.  No past surgical history on file.  No family history on file.     Review of Systems   Constitutional:  Positive for appetite change. Negative for chills and fever.   HENT:  Positive for congestion.    Eyes:  Negative for discharge and redness.   Respiratory:  Positive for cough and shortness of breath.    Cardiovascular:  Positive for leg swelling. Negative for chest pain.   Gastrointestinal:  Positive for abdominal pain and diarrhea. Negative for nausea and vomiting.   Genitourinary:  Positive for decreased urine volume.   Musculoskeletal:  Negative for myalgias, neck pain and neck stiffness.   Neurological:  Negative for syncope.       Physical Exam     Initial Vitals [01/12/25 0430]   BP Pulse Resp Temp SpO2   132/74 (!) 111 (!) 22 99 °F (37.2 °C) 100 %      MAP       --         Physical Exam    Nursing note and vitals reviewed.  Constitutional: She appears well-developed and well-nourished. She is not diaphoretic.   Ill appearing, nontoxic. Drowsy on initial evaluation, easily arousable to voice.    HENT:   Head: Normocephalic and atraumatic.   Dry mucous membranes.  Upper dentures in place.   Neck: Neck supple.   Normal range of motion.  Cardiovascular:  Intact distal pulses.           Sinus tachycardia. 1+ DP bilaterally.  Trace pitting pretibial edema bilateral lower extremities.  No unilateral leg swelling or calf tenderness.   Pulmonary/Chest:   Expiratory wheeze throughout both lung zones.  Coughing often during exam. SpO2 88-90% on RA. SpO2 95% on 2L NC.    Abdominal: Abdomen is soft. Bowel sounds are normal. There is no abdominal tenderness.   Musculoskeletal:         General: No tenderness. Normal range of motion.      Cervical back: Normal range of motion and neck supple.     Neurological: She is alert and oriented to person, place, and time. She has normal strength. GCS score is 15. GCS eye subscore is 4. GCS verbal subscore is 5. GCS motor subscore is 6.   Symmetric upper and lower extremity strength.  No lower extremity motor drift.   Skin: Skin is warm.   Psychiatric: She has a normal mood and affect. Thought content normal.         ED Course   Critical Care    Date/Time: 1/12/2025 6:53 AM    Performed by: Carson Batista PA-C  Authorized by: Brad Watters MD  Direct patient critical care time: 30 minutes  Additional history critical care time: 20 minutes  Ordering / reviewing critical care time: 20 minutes  Documentation critical care time: 20 minutes  Consulting other physicians critical care time: 10 minutes  Total critical care time (exclusive of procedural time) : 100 minutes  Critical care was necessary to treat or prevent imminent or life-threatening deterioration of the following conditions: sepsis, respiratory failure and cardiac failure.  Critical care was time spent personally by me on the following activities: development of treatment plan with patient or surrogate, interpretation of cardiac output measurements, evaluation of patient's response to treatment, examination of patient, obtaining history from patient or surrogate, ordering and performing treatments and interventions, ordering and review of laboratory studies, ordering and review of radiographic studies, pulse oximetry, re-evaluation of patient's condition and review of old charts.        Labs Reviewed   CBC W/ AUTO DIFFERENTIAL - Abnormal       Result Value    WBC 19.26 (*)     RBC 3.40 (*)     Hemoglobin 9.6 (*)     Hematocrit 29.7 (*)     MCV 87      MCH 28.2      MCHC 32.3       RDW 12.9      Platelets 273      MPV 9.9      Immature Granulocytes 1.0 (*)     Gran # (ANC) 16.7 (*)     Immature Grans (Abs) 0.19 (*)     Lymph # 1.2      Mono # 1.1 (*)     Eos # 0.1      Baso # 0.07      nRBC 0      Gran % 86.7 (*)     Lymph % 6.0 (*)     Mono % 5.6      Eosinophil % 0.3      Basophil % 0.4      Differential Method Automated     COMPREHENSIVE METABOLIC PANEL - Abnormal    Sodium 131 (*)     Potassium 4.5      Chloride 97      CO2 23      Glucose 142 (*)     BUN 18      Creatinine 0.8      Calcium 8.7      Total Protein 7.6      Albumin 2.7 (*)     Total Bilirubin 0.4      Alkaline Phosphatase 215 (*)     AST 43 (*)     ALT 45 (*)     eGFR >60      Anion Gap 11     B-TYPE NATRIURETIC PEPTIDE - Abnormal     (*)    ISTAT PROCEDURE - Abnormal    POC PH 7.391      POC PCO2 49.5 (*)     POC PO2 35 (*)     POC HCO3 30.1 (*)     POC BE 4 (*)     POC SATURATED O2 65      POC TCO2 32 (*)     Sample VENOUS      Site Other      Allens Test N/A      DelSys Nasal Can      Mode SPONT      Flow 2     POCT GLUCOSE - Abnormal    POCT Glucose 146 (*)    TROPONIN I    Troponin I 0.020     MAGNESIUM    Magnesium 2.1     RSV ANTIGEN DETECTION    RSV Source Nasopharyngeal Swab      RSV Ag by Molecular Method Negative     LACTIC ACID, PLASMA    Lactate (Lactic Acid) 1.1     POCT INFLUENZA A/B MOLECULAR   SARS-COV-2 RDRP GENE   POCT GLUCOSE MONITORING CONTINUOUS     EKG Readings: (Independently Interpreted)   AFib, rate of approx 83 beats per minute.  Normal QT.  Normal QRS duration.  No right axis deviation.  No convincing ST elevation.     ECG Results              EKG 12-lead (Final result)        Collection Time Result Time QRS Duration OHS QTC Calculation    01/12/25 06:12:20 01/12/25 11:12:47 94 472                     Final result by Interface, Lab In [facility] (01/12/25 11:12:49)                   Narrative:    Test Reason : R06.02,    Vent. Rate :  83 BPM     Atrial Rate :  84 BPM     P-R Int :    ms           QRS Dur :  94 ms      QT Int : 402 ms       P-R-T Axes :     28 211 degrees    QTcB Int : 472 ms    Atrial fibrillation  Low voltage QRS  Nonspecific T wave abnormality  Prolonged QT  Abnormal ECG  When compared with ECG of 08-Dec-2011 16:06,  Significant changes have occurred  Confirmed by Brad Ulloa ([id number]) on 1/12/2025 11:12:44 AM    Referred By: AAAREFERRAL SELF           Confirmed By: Brad Ulloa                                  Imaging Results              CTA Chest Non-Coronary (PE Studies) (Final result)  Result time 01/12/25 08:07:36      Final result by Luke Dave MD (01/12/25 08:07:36)                   Impression:      No pulmonary emboli visualized within limitations as above.    Bilateral pulmonary opacities including large right upper lobe consolidation extending to the right suprahilar location.  Correlate with follow-up and possible bronchoscopy as clinically indicated.    Right greater than left pleural effusions.      Electronically signed by: Luke Dave MD  Date:    01/12/2025  Time:    08:07               Narrative:    EXAMINATION:  CTA CHEST NON CORONARY (PE STUDIES)    CLINICAL HISTORY:  Pulmonary embolism (PE) suspected, high prob;    TECHNIQUE:  Low dose axial images, sagittal and coronal reformations were obtained from the thoracic inlet to the lung bases following the IV administration of 75 mL of Omnipaque 350.  Contrast timing was optimized to evaluate the pulmonary arteries.  MIP images were performed.    COMPARISON:  Prior chest radiograph    FINDINGS:  Study limited by mildly suboptimal pulmonary artery opacification.  Respiratory motion also limits evaluation.  No definite pulmonary embolus appreciated within limitations.    Thoracic aorta and great vessels unremarkable.  Heart demonstrates significant chamber enlargement or pericardial effusion.  No pathologically enlarged axillary, mediastinal or hilar lymph nodes appreciated.  Small mediastinal and  bilateral hilar lymph nodes present.    Small to moderate layering right pleural effusion with tiny left pleural effusion.  Adjacent atelectatic changes noted.  Wedge-shaped moderate to large airspace opacity present within the right upper lobe extending toward the right suprahilar location.  Smaller patchy opacities noted within the right lower and middle lobes as well as within the left upper lobe and superomedial aspect of the left lower lobe.    Imaged upper abdominal structures demonstrate no acute abnormality.  No acute osseous abnormality.                                       X-Ray Chest AP Portable (Final result)  Result time 01/12/25 07:49:07      Final result by Luke Dave MD (01/12/25 07:49:07)                   Impression:      Congestive failure findings suspected      Electronically signed by: Luke Dave MD  Date:    01/12/2025  Time:    07:49               Narrative:    EXAMINATION:  XR CHEST AP PORTABLE    CLINICAL HISTORY:  SOB;    TECHNIQUE:  Single frontal view of the chest was performed.    COMPARISON:  None    FINDINGS:  Cardiac silhouette is prominent.  Mediastinal contours unremarkable.  Central pulmonary vasculature prominence noted with bibasilar hazy airspace opacity.  Right pleural effusion present with fluid within the right fissure.  Degenerative findings noted.                                    X-Rays:   Independently Interpreted Readings:   Chest X-Ray: Personal interpretation:  Cardiomegaly, right-sided pulmonary congestion, slight blunting of right-sided costophrenic angle, dense consolidation versus focal collection to right upper and mid lung zone     Medications   sodium chloride 0.9% flush 10 mL (has no administration in time range)   enoxaparin injection 40 mg (40 mg Subcutaneous Given 1/12/25 1639)   cefTRIAXone injection 2 g (2 g Intravenous Given 1/12/25 1300)   azithromycin (ZITHROMAX) 500 mg in 0.9% NaCl 250 mL IVPB (admixture device) (0 mg Intravenous  Stopped 1/12/25 1410)   polyethylene glycol packet 17 g (has no administration in time range)   ondansetron injection 8 mg (has no administration in time range)   acetaminophen tablet 650 mg (has no administration in time range)   dextromethorphan-guaiFENesin  mg/5 ml liquid 5 mL (5 mLs Oral Given 1/12/25 1755)   benzonatate capsule 200 mg (has no administration in time range)   prochlorperazine injection Soln 5 mg (has no administration in time range)   albuterol-ipratropium 2.5 mg-0.5 mg/3 mL nebulizer solution 3 mL (0 mLs Nebulization Return to Cabinet 1/12/25 1104)   melatonin tablet 6 mg (has no administration in time range)   hydrALAZINE injection 10 mg (has no administration in time range)   aspirin EC tablet 81 mg (81 mg Oral Given 1/12/25 1039)   carvediloL tablet 25 mg (25 mg Oral Given 1/12/25 1639)   NIFEdipine 24 hr tablet 30 mg (30 mg Oral Given 1/12/25 1039)   valsartan tablet 160 mg (160 mg Oral Given 1/12/25 1039)   traMADoL tablet 50 mg (has no administration in time range)   piperacillin-tazobactam (ZOSYN) 4.5 g in D5W 100 mL IVPB (MB+) (0 g Intravenous Stopped 1/12/25 0804)   levoFLOXacin 750 mg/150 mL IVPB 750 mg (0 mg Intravenous Stopped 1/12/25 0950)   lactated ringers bolus 500 mL (0 mLs Intravenous Stopped 1/12/25 0749)   albuterol-ipratropium 2.5 mg-0.5 mg/3 mL nebulizer solution 3 mL (3 mLs Nebulization Given 1/12/25 0614)   iohexoL (OMNIPAQUE 350) injection 75 mL (75 mLs Intravenous Given 1/12/25 0707)   vancomycin (VANCOCIN) 1,750 mg in 0.9% NaCl 500 mL IVPB (0 mg Intravenous Stopped 1/12/25 1152)     Medical Decision Making  Differential diagnosis:  CHF, ACS, viral URI, pneumonia, bronchitis, PE, COPD exacerbation, asthma exacerbation    Amount and/or Complexity of Data Reviewed  External Data Reviewed: notes.  Labs: ordered. Decision-making details documented in ED Course.  Radiology: ordered and independent interpretation performed. Decision-making details documented in ED  Course.  ECG/medicine tests: ordered and independent interpretation performed. Decision-making details documented in ED Course.  Discussion of management or test interpretation with external provider(s): EMS states pt took Geodon pta, may account for her drowsiness on initial eval. VBG pending.     New onset AFib, appears rate controlled without any intervention---given reported diarrhea, poor p.o. intake, will give gentle IV fluids to see if improvement of rhythm.  No hypotension. CHADSVASC 4. States daily ASA, no anticoagulation.     pH 7.39, PCO2 49, HCO3 30.  Suspect drowsiness related to Geodon rather than hypercarbia.  No headache.  No history of CVA.  No focal deficits on exam.  Low suspicion for intracranial ischemic process.    Suspected community-acquired pneumonia, associated hypoxia, now with new onset AFib.  She remains hemodynamically stablem rate controlled without any intervention in the ED. We will continue with supplemental O2, p.r.n. neb treatments, IV antibiotics, will discuss with HM.    PORT score 95.    Risk  Prescription drug management.  Decision regarding hospitalization.               ED Course as of 01/12/25 1900   Sun Jan 12, 2025   0542 Denies history of CHF.  Trace pitting pretibial edema bilateral lower extremities.  No orthopnea, questionable paroxysmal nocturnal dyspnea.  Normotensive, mild tachycardia.  Will give small bolus of fluids given no large effusion on chest x-ray, reassuring remote TTE. [SM]      ED Course User Index  [SM] Carson Batista PA-C                           Clinical Impression:  Final diagnoses:  [R06.02] Shortness of breath  [J18.9] Community acquired pneumonia, unspecified laterality (Primary)  [R09.02] Hypoxia  [R05.9] Cough, unspecified type  [I48.91] Atrial fibrillation, unspecified type  [A41.9] Sepsis, due to unspecified organism, unspecified whether acute organ dysfunction present          ED Disposition Condition    Admit                  Carson Batista, PA-C  01/12/25 1903

## 2025-01-12 NOTE — ASSESSMENT & PLAN NOTE
Patient's blood pressure range in the last 24 hours was: BP  Min: 123/76  Max: 165/72.The patient's inpatient anti-hypertensive regimen is listed below:  Current Antihypertensives  hydrALAZINE injection 10 mg, Every 8 hours PRN, Intravenous  , Daily, Oral  , Daily, Oral  , Daily, Oral  , 2 times daily with meals, Oral  carvediloL tablet 25 mg, 2 times daily with meals, Oral  NIFEdipine 24 hr tablet 30 mg, Daily, Oral  valsartan tablet 160 mg, Daily, Oral    Plan  - BP is controlled, no changes needed to their regimen

## 2025-01-12 NOTE — ASSESSMENT & PLAN NOTE
This patient does have evidence of infective focus  My overall impression is sepsis.  Source: Respiratory  Antibiotics given-   Antibiotics (72h ago, onward)      Start     Stop Route Frequency Ordered    01/12/25 1030  cefTRIAXone injection 2 g  (Community Acquired Pneumonia (CAP) - Low MDR Risk)         01/18/25 1029 IV Every 24 hours (non-standard times) 01/12/25 0923    01/12/25 1030  azithromycin (ZITHROMAX) 500 mg in 0.9% NaCl 250 mL IVPB (admixture device)  (Community Acquired Pneumonia (CAP) - Low MDR Risk)         01/17/25 1029 IV Every 24 hours (non-standard times) 01/12/25 0923          Latest lactate reviewed-  Recent Labs   Lab 01/12/25  0612   LACTATE 1.1     Fluid challenge Actual Body weight- Patient will receive 30ml/kg actual body weight to calculate fluid bolus for treatment of septic shock.     Post- resuscitation assessment Yes Perfusion exam was performed within 6 hours of septic shock presentation after bolus shows Adequate tissue perfusion assessed by non-invasive monitoring     Source control achieved by: IV ABX's.  Check sputum and blood cultures.

## 2025-01-13 LAB
ANION GAP SERPL CALC-SCNC: 12 MMOL/L (ref 8–16)
BASOPHILS # BLD AUTO: 0.06 K/UL (ref 0–0.2)
BASOPHILS NFR BLD: 0.3 % (ref 0–1.9)
BUN SERPL-MCNC: 13 MG/DL (ref 8–23)
CALCIUM SERPL-MCNC: 9.6 MG/DL (ref 8.7–10.5)
CHLORIDE SERPL-SCNC: 101 MMOL/L (ref 95–110)
CO2 SERPL-SCNC: 24 MMOL/L (ref 23–29)
CREAT SERPL-MCNC: 0.7 MG/DL (ref 0.5–1.4)
DIFFERENTIAL METHOD BLD: ABNORMAL
EOSINOPHIL # BLD AUTO: 0.1 K/UL (ref 0–0.5)
EOSINOPHIL NFR BLD: 0.4 % (ref 0–8)
ERYTHROCYTE [DISTWIDTH] IN BLOOD BY AUTOMATED COUNT: 13.1 % (ref 11.5–14.5)
EST. GFR  (NO RACE VARIABLE): >60 ML/MIN/1.73 M^2
GLUCOSE SERPL-MCNC: 91 MG/DL (ref 70–110)
HCT VFR BLD AUTO: 33.3 % (ref 37–48.5)
HGB BLD-MCNC: 10.5 G/DL (ref 12–16)
IMM GRANULOCYTES # BLD AUTO: 0.12 K/UL (ref 0–0.04)
IMM GRANULOCYTES NFR BLD AUTO: 0.6 % (ref 0–0.5)
INFLUENZA A, MOLECULAR: NEGATIVE
INFLUENZA B, MOLECULAR: NEGATIVE
LYMPHOCYTES # BLD AUTO: 1.7 K/UL (ref 1–4.8)
LYMPHOCYTES NFR BLD: 9.1 % (ref 18–48)
MAGNESIUM SERPL-MCNC: 1.9 MG/DL (ref 1.6–2.6)
MCH RBC QN AUTO: 29 PG (ref 27–31)
MCHC RBC AUTO-ENTMCNC: 31.5 G/DL (ref 32–36)
MCV RBC AUTO: 92 FL (ref 82–98)
MONOCYTES # BLD AUTO: 0.9 K/UL (ref 0.3–1)
MONOCYTES NFR BLD: 4.9 % (ref 4–15)
NEUTROPHILS # BLD AUTO: 15.7 K/UL (ref 1.8–7.7)
NEUTROPHILS NFR BLD: 84.7 % (ref 38–73)
NRBC BLD-RTO: 0 /100 WBC
PHOSPHATE SERPL-MCNC: 3.3 MG/DL (ref 2.7–4.5)
PLATELET # BLD AUTO: 284 K/UL (ref 150–450)
PMV BLD AUTO: 10.5 FL (ref 9.2–12.9)
POTASSIUM SERPL-SCNC: 4.2 MMOL/L (ref 3.5–5.1)
RBC # BLD AUTO: 3.62 M/UL (ref 4–5.4)
SARS-COV-2 RDRP RESP QL NAA+PROBE: NEGATIVE
SODIUM SERPL-SCNC: 137 MMOL/L (ref 136–145)
SPECIMEN SOURCE: NORMAL
WBC # BLD AUTO: 18.5 K/UL (ref 3.9–12.7)

## 2025-01-13 PROCEDURE — 87635 SARS-COV-2 COVID-19 AMP PRB: CPT | Performed by: HOSPITALIST

## 2025-01-13 PROCEDURE — 21400001 HC TELEMETRY ROOM

## 2025-01-13 PROCEDURE — 94761 N-INVAS EAR/PLS OXIMETRY MLT: CPT

## 2025-01-13 PROCEDURE — 63600175 PHARM REV CODE 636 W HCPCS: Performed by: HOSPITALIST

## 2025-01-13 PROCEDURE — 85025 COMPLETE CBC W/AUTO DIFF WBC: CPT | Performed by: HOSPITALIST

## 2025-01-13 PROCEDURE — 80048 BASIC METABOLIC PNL TOTAL CA: CPT | Performed by: HOSPITALIST

## 2025-01-13 PROCEDURE — 36415 COLL VENOUS BLD VENIPUNCTURE: CPT | Performed by: HOSPITALIST

## 2025-01-13 PROCEDURE — 84100 ASSAY OF PHOSPHORUS: CPT | Performed by: HOSPITALIST

## 2025-01-13 PROCEDURE — 25000003 PHARM REV CODE 250: Performed by: INTERNAL MEDICINE

## 2025-01-13 PROCEDURE — 99900035 HC TECH TIME PER 15 MIN (STAT)

## 2025-01-13 PROCEDURE — 25000003 PHARM REV CODE 250: Performed by: HOSPITALIST

## 2025-01-13 PROCEDURE — 83735 ASSAY OF MAGNESIUM: CPT | Performed by: HOSPITALIST

## 2025-01-13 PROCEDURE — 87502 INFLUENZA DNA AMP PROBE: CPT | Performed by: HOSPITALIST

## 2025-01-13 PROCEDURE — 27000221 HC OXYGEN, UP TO 24 HOURS

## 2025-01-13 RX ADMIN — GUAIFENESIN AND DEXTROMETHORPHAN 5 ML: 100; 10 SYRUP ORAL at 01:01

## 2025-01-13 RX ADMIN — VALSARTAN 160 MG: 80 TABLET, FILM COATED ORAL at 10:01

## 2025-01-13 RX ADMIN — AZITHROMYCIN DIHYDRATE 500 MG: 500 INJECTION, POWDER, LYOPHILIZED, FOR SOLUTION INTRAVENOUS at 10:01

## 2025-01-13 RX ADMIN — GUAIFENESIN AND DEXTROMETHORPHAN 5 ML: 100; 10 SYRUP ORAL at 05:01

## 2025-01-13 RX ADMIN — GUAIFENESIN AND DEXTROMETHORPHAN 5 ML: 100; 10 SYRUP ORAL at 11:01

## 2025-01-13 RX ADMIN — ASPIRIN 81 MG: 81 TABLET, COATED ORAL at 10:01

## 2025-01-13 RX ADMIN — ENOXAPARIN SODIUM 40 MG: 40 INJECTION SUBCUTANEOUS at 05:01

## 2025-01-13 RX ADMIN — CEFTRIAXONE 2 G: 2 INJECTION, POWDER, FOR SOLUTION INTRAMUSCULAR; INTRAVENOUS at 10:01

## 2025-01-13 RX ADMIN — CARVEDILOL 25 MG: 12.5 TABLET, FILM COATED ORAL at 10:01

## 2025-01-13 RX ADMIN — CARVEDILOL 25 MG: 12.5 TABLET, FILM COATED ORAL at 05:01

## 2025-01-13 RX ADMIN — ZIPRASIDONE HYDROCHLORIDE 80 MG: 20 CAPSULE ORAL at 09:01

## 2025-01-13 RX ADMIN — NIFEDIPINE 30 MG: 30 TABLET, FILM COATED, EXTENDED RELEASE ORAL at 10:01

## 2025-01-13 NOTE — ASSESSMENT & PLAN NOTE
Hyponatremia is likely due to Dehydration/hypovolemia. The patient's most recent sodium results are listed below.  Recent Labs     01/12/25  0519 01/13/25  0436   * 137       Plan  - Correct the sodium by 4-6mEq in 24 hours.   - Will treat the hyponatremia with IVF's  - Monitor sodium Daily.   Resolved

## 2025-01-13 NOTE — HOSPITAL COURSE
75 y/o female with past medical history of HTN, HLP presents with shortness of breath.  Patient complains of 3 days of shortness of breath with associated cough, congestion and wheezing.  She also complains of diarrhea.  States multiple grandchildren with URI symptoms, another one of her grand children with diarrhea.  She was experiencing some associated right-sided abdominal pain, which has resolved. Patient states decreased appetite and intake since onset of symptoms.  Some improvement of wheezing with inhaler, but no improvement of shortness of breath.  Dyspnea is both at rest and on exertion. Patient was admitted with pneumonia and started on IV ABx's.  Patient markedly improved compared to on presentation; acute hypoxic respiratory failure completely resolved.  Patient was discharged on oral antibiotics and instructed to come back to the hospital in the event of fever, lethargy, or shortness of breath.  All patient's questions were answered to her satisfaction and she verbalized understanding.  Patient was also instructed to follow up with her primary care provider

## 2025-01-13 NOTE — PROGRESS NOTES
[facility] Medicine  Progress Note    Patient Name: Vandana López  MRN: [id number]  Patient Class: IP- Inpatient   Admission Date: 1/12/2025  Length of Stay: 1 days  Attending Physician: Jose Goldsmith MD  Primary Care Provider: Alfreda, Primary Doctor        Subjective     Principal Problem:Sepsis        HPI:  73 y/o female with past medical history of HTN, HLP presents with shortness of breath.  Patient complains of 3 days of shortness of breath with associated cough, congestion and wheezing.  She also complains of diarrhea.  States multiple grandchildren with URI symptoms, another one of her grand children with diarrhea.  She was experiencing some associated right-sided abdominal pain, which has resolved. Patient states decreased appetite and intake since onset of symptoms.  Some improvement of wheezing with inhaler, but no improvement of shortness of breath.  Dyspnea is both at rest and on exertion.  No alleviating factors.  Denies any fever or chills.  No other complaints.    Overview/Hospital Course:  73 y/o female presents with shortness of breath.  Admitted with pneumonia and started on IV ABx's.    Interval History: feeling better with less shortness of breath.    Review of Systems   HENT:  Negative for ear discharge and ear pain.    Eyes:  Negative for discharge and itching.   Endocrine: Negative for cold intolerance and heat intolerance.   Neurological:  Negative for seizures and syncope.     Objective:     Vital Signs (Most Recent):  Temp: 98.7 °F (37.1 °C) (01/13/25 1055)  Pulse: 79 (01/13/25 1102)  Resp: 18 (01/13/25 1055)  BP: (!) 143/82 (01/13/25 1055)  SpO2: 97 % (01/13/25 1055) Vital Signs (24h Range):  Temp:  [98.1 °F (36.7 °C)-99.6 °F (37.6 °C)] 98.7 °F (37.1 °C)  Pulse:  [] 79  Resp:  [18] 18  SpO2:  [90 %-97 %] 97 %  BP: (130-164)/(67-88) 143/82     Weight: 91.5 kg (201 lb 11.5 oz)  Body mass index is 31.59 kg/m².    Intake/Output Summary (Last 24 hours) at  1/13/2025 1243  Last data filed at 1/13/2025 1055  Gross per 24 hour   Intake 240 ml   Output 2300 ml   Net -2060 ml         Physical Exam  Constitutional:       General: She is not in acute distress.     Appearance: She is not diaphoretic.   HENT:      Head: Normocephalic and atraumatic.      Mouth/Throat:      Mouth: Mucous membranes are dry.      Pharynx: No oropharyngeal exudate or posterior oropharyngeal erythema.   Cardiovascular:      Rate and Rhythm: Normal rate and regular rhythm.   Pulmonary:      Effort: Pulmonary effort is normal.      Breath sounds: Rales present.   Abdominal:      General: Bowel sounds are normal.      Palpations: Abdomen is soft.   Musculoskeletal:         General: No deformity or signs of injury.   Skin:     General: Skin is warm and dry.   Neurological:      General: No focal deficit present.      Mental Status: Mental status is at baseline.             Significant Labs: All pertinent labs within the past 24 hours have been reviewed.  BMP:   Recent Labs   Lab 01/13/25  0436   GLU 91      K 4.2      CO2 24   BUN 13   CREATININE 0.7   CALCIUM 9.6   MG 1.9     CBC:   Recent Labs   Lab 01/12/25  0519 01/13/25  0738   WBC 19.26* 18.50*   HGB 9.6* 10.5*   HCT 29.7* 33.3*    284       Significant Imaging: I have reviewed all pertinent imaging results/findings within the past 24 hours.    Assessment and Plan     * Sepsis  This patient does have evidence of infective focus  My overall impression is sepsis.  Source: Respiratory  Antibiotics given-   Antibiotics (72h ago, onward)      Start     Stop Route Frequency Ordered    01/12/25 1030  cefTRIAXone injection 2 g  (Community Acquired Pneumonia (CAP) - Low MDR Risk)         01/18/25 1029 IV Every 24 hours (non-standard times) 01/12/25 0923    01/12/25 1030  azithromycin (ZITHROMAX) 500 mg in 0.9% NaCl 250 mL IVPB (admixture device)  (Community Acquired Pneumonia (CAP) - Low MDR Risk)         01/17/25 1029 IV Every 24 hours  (non-standard times) 01/12/25 0923          Latest lactate reviewed-  Recent Labs   Lab 01/12/25  0612   LACTATE 1.1       Fluid challenge Actual Body weight- Patient will receive 30ml/kg actual body weight to calculate fluid bolus for treatment of septic shock.     Post- resuscitation assessment Yes Perfusion exam was performed within 6 hours of septic shock presentation after bolus shows Adequate tissue perfusion assessed by non-invasive monitoring     Source control achieved by: IV ABX's.  Cultures negative so far.  Continue current regimen.  Mobilize.    Possibly transition to oral ABx's tomorrow and then home.    Hyperlipidemia  Does not appear to be on statin, most likely related to elevated LFT's.      Hyponatremia  Hyponatremia is likely due to Dehydration/hypovolemia. The patient's most recent sodium results are listed below.  Recent Labs     01/12/25  0519 01/13/25  0436   * 137       Plan  - Correct the sodium by 4-6mEq in 24 hours.   - Will treat the hyponatremia with IVF's  - Monitor sodium Daily.   Resolved     Anemia  Anemia is likely due to  unsure etiology, but seems to be chronic and normocytic . Most recent hemoglobin and hematocrit are listed below.  Recent Labs     01/12/25  0519   HGB 9.6*   HCT 29.7*     Plan  - Monitor serial CBC: Daily  - Transfuse PRBC if patient becomes hemodynamically unstable, symptomatic or H/H drops below 7/21.  - Patient has not received any PRBC transfusions to date      Benign essential hypertension  Patient's blood pressure range in the last 24 hours was: BP  Min: 130/68  Max: 164/84.The patient's inpatient anti-hypertensive regimen is listed below:  Current Antihypertensives  hydrALAZINE injection 10 mg, Every 8 hours PRN, Intravenous  , Daily, Oral  , Daily, Oral  , Daily, Oral  , 2 times daily with meals, Oral  carvediloL tablet 25 mg, 2 times daily with meals, Oral  NIFEdipine 24 hr tablet 30 mg, Daily, Oral  valsartan tablet 160 mg, Daily,  Oral    Plan  - BP is controlled, no changes needed to their regimen    Pneumonia  Patient has a diagnosis of pneumonia. The cause of the pneumonia is suspected to be bacterial in etiology but organism is not known. The pneumonia is stable. The patient has the following signs/symptoms of pneumonia: cough and shortness of breath. The patient does have a current oxygen requirement and the patient does not have a home oxygen requirement. I have reviewed the pertinent imaging. The following cultures have been collected: Blood cultures and Sputum culture The culture results are listed below.     Current antimicrobial regimen consists of the antibiotics listed below. Will monitor patient closely and continue current treatment plan unchanged.    Antibiotics (From admission, onward)      Start     Stop Route Frequency Ordered    01/12/25 1030  cefTRIAXone injection 2 g  (Community Acquired Pneumonia (CAP) - Low MDR Risk)         01/18/25 1029 IV Every 24 hours (non-standard times) 01/12/25 0923    01/12/25 1030  azithromycin (ZITHROMAX) 500 mg in 0.9% NaCl 250 mL IVPB (admixture device)  (Community Acquired Pneumonia (CAP) - Low MDR Risk)         01/17/25 1029 IV Every 24 hours (non-standard times) 01/12/25 0923            Microbiology Results (last 7 days)       Procedure Component Value Units Date/Time    Culture, Respiratory with Gram Stain [[id number]]     Order Status: No result Specimen: Respiratory     Blood Culture #1 **CANNOT BE ORDERED STAT** [[id number]] Collected: 01/12/25 0651    Order Status: Sent Specimen: Blood from Peripheral, Hand, Left Updated: 01/12/25 0716    Blood Culture #2 **CANNOT BE ORDERED STAT** [[id number]] Collected: 01/12/25 0653    Order Status: Sent Specimen: Blood from Peripheral, Hand, Right Updated: 01/12/25 0716              VTE Risk Mitigation (From admission, onward)           Ordered     enoxaparin injection 40 mg  Daily         01/12/25 0923     IP VTE HIGH RISK PATIENT  Once          01/12/25 0923     Place sequential compression device  Until discontinued         01/12/25 0923                    Discharge Planning   IRVIN:      Code Status: Full Code   Medical Readiness for Discharge Date:   Discharge Plan A: Home with family (Follow-ups)                        Jose Goldsmith MD  Department of Hospital Medicine   [facility] [place] [facility] [place]

## 2025-01-13 NOTE — SUBJECTIVE & OBJECTIVE
Interval History: feeling better with less shortness of breath.    Review of Systems   HENT:  Negative for ear discharge and ear pain.    Eyes:  Negative for discharge and itching.   Endocrine: Negative for cold intolerance and heat intolerance.   Neurological:  Negative for seizures and syncope.     Objective:     Vital Signs (Most Recent):  Temp: 98.7 °F (37.1 °C) (01/13/25 1055)  Pulse: 79 (01/13/25 1102)  Resp: 18 (01/13/25 1055)  BP: (!) 143/82 (01/13/25 1055)  SpO2: 97 % (01/13/25 1055) Vital Signs (24h Range):  Temp:  [98.1 °F (36.7 °C)-99.6 °F (37.6 °C)] 98.7 °F (37.1 °C)  Pulse:  [] 79  Resp:  [18] 18  SpO2:  [90 %-97 %] 97 %  BP: (130-164)/(67-88) 143/82     Weight: 91.5 kg (201 lb 11.5 oz)  Body mass index is 31.59 kg/m².    Intake/Output Summary (Last 24 hours) at 1/13/2025 1243  Last data filed at 1/13/2025 1055  Gross per 24 hour   Intake 240 ml   Output 2300 ml   Net -2060 ml         Physical Exam  Constitutional:       General: She is not in acute distress.     Appearance: She is not diaphoretic.   HENT:      Head: Normocephalic and atraumatic.      Mouth/Throat:      Mouth: Mucous membranes are dry.      Pharynx: No oropharyngeal exudate or posterior oropharyngeal erythema.   Cardiovascular:      Rate and Rhythm: Normal rate and regular rhythm.   Pulmonary:      Effort: Pulmonary effort is normal.      Breath sounds: Rales present.   Abdominal:      General: Bowel sounds are normal.      Palpations: Abdomen is soft.   Musculoskeletal:         General: No deformity or signs of injury.   Skin:     General: Skin is warm and dry.   Neurological:      General: No focal deficit present.      Mental Status: Mental status is at baseline.             Significant Labs: All pertinent labs within the past 24 hours have been reviewed.  BMP:   Recent Labs   Lab 01/13/25  0436   GLU 91      K 4.2      CO2 24   BUN 13   CREATININE 0.7   CALCIUM 9.6   MG 1.9     CBC:   Recent Labs   Lab  01/12/25  0519 01/13/25  0738   WBC 19.26* 18.50*   HGB 9.6* 10.5*   HCT 29.7* 33.3*    284       Significant Imaging: I have reviewed all pertinent imaging results/findings within the past 24 hours.

## 2025-01-13 NOTE — CARE UPDATE
Per nurse, Ms López , rm 313, dx pneumonia, admitted today on the previous shift, is requesting her home med, geodon 80 mg. Stating she takes it nightly.  It is on her outside med list. She has a hx of bipolar.     -Added her home Geodon 80mg HS  -Care to QTc on Zithro - place on tele

## 2025-01-13 NOTE — NURSING
Report received from gracie Alex RN. Patient resting quietly, no apparent distress noted at this time. Wheels locked in lowest position, call light within reach, Telemetry monitoring in place.    [facility] - [place]  Nurses Note -- 4 Eyes      1/13/2025       Skin assessed on: Q Shift      [x] No Pressure Injuries Present    [x]Prevention Measures Documented    [] Yes LDA  for Pressure Injury Previously documented     [] Yes New Pressure Injury Discovered   [] LDA for New Pressure Injury Added      Attending RN:  Olya Gallardo LPN     Second RN:  JUAN FRANCISCO Alex

## 2025-01-13 NOTE — ASSESSMENT & PLAN NOTE
Patient's blood pressure range in the last 24 hours was: BP  Min: 130/68  Max: 164/84.The patient's inpatient anti-hypertensive regimen is listed below:  Current Antihypertensives  hydrALAZINE injection 10 mg, Every 8 hours PRN, Intravenous  , Daily, Oral  , Daily, Oral  , Daily, Oral  , 2 times daily with meals, Oral  carvediloL tablet 25 mg, 2 times daily with meals, Oral  NIFEdipine 24 hr tablet 30 mg, Daily, Oral  valsartan tablet 160 mg, Daily, Oral    Plan  - BP is controlled, no changes needed to their regimen

## 2025-01-13 NOTE — ASSESSMENT & PLAN NOTE
This patient does have evidence of infective focus  My overall impression is sepsis.  Source: Respiratory  Antibiotics given-   Antibiotics (72h ago, onward)      Start     Stop Route Frequency Ordered    01/12/25 1030  cefTRIAXone injection 2 g  (Community Acquired Pneumonia (CAP) - Low MDR Risk)         01/18/25 1029 IV Every 24 hours (non-standard times) 01/12/25 0923    01/12/25 1030  azithromycin (ZITHROMAX) 500 mg in 0.9% NaCl 250 mL IVPB (admixture device)  (Community Acquired Pneumonia (CAP) - Low MDR Risk)         01/17/25 1029 IV Every 24 hours (non-standard times) 01/12/25 0923          Latest lactate reviewed-  Recent Labs   Lab 01/12/25  0612   LACTATE 1.1       Fluid challenge Actual Body weight- Patient will receive 30ml/kg actual body weight to calculate fluid bolus for treatment of septic shock.     Post- resuscitation assessment Yes Perfusion exam was performed within 6 hours of septic shock presentation after bolus shows Adequate tissue perfusion assessed by non-invasive monitoring     Source control achieved by: IV ABX's.  Cultures negative so far.  Continue current regimen.  Mobilize.    Possibly transition to oral ABx's tomorrow and then home.

## 2025-01-14 PROBLEM — J96.01 ACUTE HYPOXIC RESPIRATORY FAILURE: Status: ACTIVE | Noted: 2025-01-14

## 2025-01-14 LAB
ANION GAP SERPL CALC-SCNC: 10 MMOL/L (ref 8–16)
BACTERIA SPEC AEROBE CULT: ABNORMAL
BASOPHILS # BLD AUTO: 0.06 K/UL (ref 0–0.2)
BASOPHILS NFR BLD: 0.3 % (ref 0–1.9)
BUN SERPL-MCNC: 12 MG/DL (ref 8–23)
CALCIUM SERPL-MCNC: 9.8 MG/DL (ref 8.7–10.5)
CHLORIDE SERPL-SCNC: 103 MMOL/L (ref 95–110)
CO2 SERPL-SCNC: 26 MMOL/L (ref 23–29)
CREAT SERPL-MCNC: 0.7 MG/DL (ref 0.5–1.4)
DIFFERENTIAL METHOD BLD: ABNORMAL
EOSINOPHIL # BLD AUTO: 0.1 K/UL (ref 0–0.5)
EOSINOPHIL NFR BLD: 0.6 % (ref 0–8)
ERYTHROCYTE [DISTWIDTH] IN BLOOD BY AUTOMATED COUNT: 13.1 % (ref 11.5–14.5)
EST. GFR  (NO RACE VARIABLE): >60 ML/MIN/1.73 M^2
GLUCOSE SERPL-MCNC: 104 MG/DL (ref 70–110)
GRAM STN SPEC: ABNORMAL
HCT VFR BLD AUTO: 33.8 % (ref 37–48.5)
HGB BLD-MCNC: 10.3 G/DL (ref 12–16)
IMM GRANULOCYTES # BLD AUTO: 0.14 K/UL (ref 0–0.04)
IMM GRANULOCYTES NFR BLD AUTO: 0.6 % (ref 0–0.5)
LYMPHOCYTES # BLD AUTO: 1.4 K/UL (ref 1–4.8)
LYMPHOCYTES NFR BLD: 6.3 % (ref 18–48)
MAGNESIUM SERPL-MCNC: 1.7 MG/DL (ref 1.6–2.6)
MCH RBC QN AUTO: 28.3 PG (ref 27–31)
MCHC RBC AUTO-ENTMCNC: 30.5 G/DL (ref 32–36)
MCV RBC AUTO: 93 FL (ref 82–98)
MONOCYTES # BLD AUTO: 0.9 K/UL (ref 0.3–1)
MONOCYTES NFR BLD: 4 % (ref 4–15)
NEUTROPHILS # BLD AUTO: 19.9 K/UL (ref 1.8–7.7)
NEUTROPHILS NFR BLD: 88.2 % (ref 38–73)
NRBC BLD-RTO: 0 /100 WBC
PHOSPHATE SERPL-MCNC: 3.3 MG/DL (ref 2.7–4.5)
PLATELET # BLD AUTO: 303 K/UL (ref 150–450)
PMV BLD AUTO: 10.5 FL (ref 9.2–12.9)
POTASSIUM SERPL-SCNC: 3.9 MMOL/L (ref 3.5–5.1)
RBC # BLD AUTO: 3.64 M/UL (ref 4–5.4)
SODIUM SERPL-SCNC: 139 MMOL/L (ref 136–145)
WBC # BLD AUTO: 22.53 K/UL (ref 3.9–12.7)

## 2025-01-14 PROCEDURE — 84100 ASSAY OF PHOSPHORUS: CPT | Performed by: HOSPITALIST

## 2025-01-14 PROCEDURE — 25000003 PHARM REV CODE 250: Performed by: INTERNAL MEDICINE

## 2025-01-14 PROCEDURE — 25000242 PHARM REV CODE 250 ALT 637 W/ HCPCS

## 2025-01-14 PROCEDURE — 94761 N-INVAS EAR/PLS OXIMETRY MLT: CPT

## 2025-01-14 PROCEDURE — 94640 AIRWAY INHALATION TREATMENT: CPT

## 2025-01-14 PROCEDURE — 94664 DEMO&/EVAL PT USE INHALER: CPT

## 2025-01-14 PROCEDURE — 63600175 PHARM REV CODE 636 W HCPCS: Performed by: HOSPITALIST

## 2025-01-14 PROCEDURE — 27000221 HC OXYGEN, UP TO 24 HOURS

## 2025-01-14 PROCEDURE — 85025 COMPLETE CBC W/AUTO DIFF WBC: CPT | Performed by: HOSPITALIST

## 2025-01-14 PROCEDURE — 25000003 PHARM REV CODE 250: Performed by: HOSPITALIST

## 2025-01-14 PROCEDURE — 80048 BASIC METABOLIC PNL TOTAL CA: CPT | Performed by: HOSPITALIST

## 2025-01-14 PROCEDURE — 27000646 HC AEROBIKA DEVICE

## 2025-01-14 PROCEDURE — 99900031 HC PATIENT EDUCATION (STAT)

## 2025-01-14 PROCEDURE — 94799 UNLISTED PULMONARY SVC/PX: CPT

## 2025-01-14 PROCEDURE — 83735 ASSAY OF MAGNESIUM: CPT | Performed by: HOSPITALIST

## 2025-01-14 PROCEDURE — 36415 COLL VENOUS BLD VENIPUNCTURE: CPT | Performed by: HOSPITALIST

## 2025-01-14 PROCEDURE — 21400001 HC TELEMETRY ROOM

## 2025-01-14 PROCEDURE — 99900035 HC TECH TIME PER 15 MIN (STAT)

## 2025-01-14 RX ORDER — IPRATROPIUM BROMIDE AND ALBUTEROL SULFATE 2.5; .5 MG/3ML; MG/3ML
3 SOLUTION RESPIRATORY (INHALATION) EVERY 6 HOURS
Status: DISCONTINUED | OUTPATIENT
Start: 2025-01-14 | End: 2025-01-16 | Stop reason: HOSPADM

## 2025-01-14 RX ADMIN — IPRATROPIUM BROMIDE AND ALBUTEROL SULFATE 3 ML: 2.5; .5 SOLUTION RESPIRATORY (INHALATION) at 07:01

## 2025-01-14 RX ADMIN — IPRATROPIUM BROMIDE AND ALBUTEROL SULFATE 3 ML: 2.5; .5 SOLUTION RESPIRATORY (INHALATION) at 01:01

## 2025-01-14 RX ADMIN — ENOXAPARIN SODIUM 40 MG: 40 INJECTION SUBCUTANEOUS at 04:01

## 2025-01-14 RX ADMIN — GUAIFENESIN AND DEXTROMETHORPHAN 5 ML: 100; 10 SYRUP ORAL at 05:01

## 2025-01-14 RX ADMIN — BENZONATATE 200 MG: 100 CAPSULE ORAL at 08:01

## 2025-01-14 RX ADMIN — CARVEDILOL 25 MG: 12.5 TABLET, FILM COATED ORAL at 04:01

## 2025-01-14 RX ADMIN — AZITHROMYCIN DIHYDRATE 500 MG: 500 INJECTION, POWDER, LYOPHILIZED, FOR SOLUTION INTRAVENOUS at 09:01

## 2025-01-14 RX ADMIN — GUAIFENESIN AND DEXTROMETHORPHAN 5 ML: 100; 10 SYRUP ORAL at 11:01

## 2025-01-14 RX ADMIN — VALSARTAN 160 MG: 80 TABLET, FILM COATED ORAL at 09:01

## 2025-01-14 RX ADMIN — CEFTRIAXONE 2 G: 2 INJECTION, POWDER, FOR SOLUTION INTRAMUSCULAR; INTRAVENOUS at 09:01

## 2025-01-14 RX ADMIN — NIFEDIPINE 30 MG: 30 TABLET, FILM COATED, EXTENDED RELEASE ORAL at 09:01

## 2025-01-14 RX ADMIN — CARVEDILOL 25 MG: 12.5 TABLET, FILM COATED ORAL at 09:01

## 2025-01-14 RX ADMIN — ASPIRIN 81 MG: 81 TABLET, COATED ORAL at 09:01

## 2025-01-14 RX ADMIN — ZIPRASIDONE HYDROCHLORIDE 80 MG: 20 CAPSULE ORAL at 08:01

## 2025-01-14 NOTE — PROGRESS NOTES
[facility] Medicine  Progress Note    Patient Name: Vandana López  MRN: [id number]  Patient Class: IP- Inpatient   Admission Date: 1/12/2025  Length of Stay: 2 days  Attending Physician: Tyler Villavicencio MD  Primary Care Provider: Alfreda, Primary Doctor        Subjective     Principal Problem:Sepsis        HPI:  75 y/o female with past medical history of HTN, HLP presents with shortness of breath.  Patient complains of 3 days of shortness of breath with associated cough, congestion and wheezing.  She also complains of diarrhea.  States multiple grandchildren with URI symptoms, another one of her grand children with diarrhea.  She was experiencing some associated right-sided abdominal pain, which has resolved. Patient states decreased appetite and intake since onset of symptoms.  Some improvement of wheezing with inhaler, but no improvement of shortness of breath.  Dyspnea is both at rest and on exertion.  No alleviating factors.  Denies any fever or chills.  No other complaints.    Overview/Hospital Course:  75 y/o female presents with shortness of breath.  Admitted with pneumonia and started on IV ABx's.    Interval History:  No acute event overnight.  Patient still having shortness of breath and remained on supplemental oxygen.  Denied fever, but endorsed persistent cough.    Review of Systems   Constitutional:  Negative for activity change, chills and fever.   Respiratory:  Positive for cough, shortness of breath and wheezing.    Cardiovascular:  Negative for chest pain, palpitations and leg swelling.   Gastrointestinal:  Negative for abdominal distention, abdominal pain, diarrhea and nausea.     Objective:     Vital Signs (Most Recent):  Temp: 97.9 °F (36.6 °C) (01/14/25 1131)  Pulse: 75 (01/14/25 1131)  Resp: 19 (01/14/25 1131)  BP: (!) 163/74 (01/14/25 1131)  SpO2: (!) 93 % (01/14/25 1131) Vital Signs (24h Range):  Temp:  [97.6 °F (36.4 °C)-98.6 °F (37 °C)] 97.9 °F (36.6 °C)  Pulse:   [68-92] 75  Resp:  [18-19] 19  SpO2:  [92 %-97 %] 93 %  BP: (124-169)/(69-83) 163/74     Weight: 91.5 kg (201 lb 11.5 oz)  Body mass index is 31.59 kg/m².    Intake/Output Summary (Last 24 hours) at 1/14/2025 1200  Last data filed at 1/13/2025 1930  Gross per 24 hour   Intake 120 ml   Output 400 ml   Net -280 ml         Physical Exam  Constitutional:       General: She is not in acute distress.     Appearance: She is not ill-appearing, toxic-appearing or diaphoretic.   Cardiovascular:      Rate and Rhythm: Normal rate and regular rhythm.      Pulses: Normal pulses.      Heart sounds: Normal heart sounds. No murmur heard.     No gallop.   Pulmonary:      Effort: Respiratory distress present.      Breath sounds: Wheezing and rhonchi present.   Chest:      Chest wall: No tenderness.   Abdominal:      General: There is no distension.      Palpations: Abdomen is soft. There is mass.      Tenderness: There is no abdominal tenderness. There is no guarding.   Neurological:      Mental Status: She is oriented to person, place, and time. Mental status is at baseline.             Significant Labs: CBC:   Recent Labs   Lab 01/13/25  0738 01/14/25  0504   WBC 18.50* 22.53*   HGB 10.5* 10.3*   HCT 33.3* 33.8*    303     CMP:   Recent Labs   Lab 01/13/25  0436 01/14/25  0504    139   K 4.2 3.9    103   CO2 24 26   GLU 91 104   BUN 13 12   CREATININE 0.7 0.7   CALCIUM 9.6 9.8   ANIONGAP 12 10       Significant Imaging: I have reviewed all pertinent imaging results/findings within the past 24 hours.    Assessment and Plan     * Sepsis  This patient does have evidence of infective focus  My overall impression is sepsis.  Source: Respiratory  Antibiotics given-   Antibiotics (72h ago, onward)      Start     Stop Route Frequency Ordered    01/12/25 1030  cefTRIAXone injection 2 g  (Community Acquired Pneumonia (CAP) - Low MDR Risk)         01/18/25 1029 IV Every 24 hours (non-standard times) 01/12/25 0923    01/12/25  1030  azithromycin (ZITHROMAX) 500 mg in 0.9% NaCl 250 mL IVPB (admixture device)  (Community Acquired Pneumonia (CAP) - Low MDR Risk)         01/17/25 1029 IV Every 24 hours (non-standard times) 01/12/25 0923          Latest lactate reviewed-  Recent Labs   Lab 01/12/25  0612   LACTATE 1.1       Fluid challenge Actual Body weight- Patient will receive 30ml/kg actual body weight to calculate fluid bolus for treatment of septic shock.     Post- resuscitation assessment Yes Perfusion exam was performed within 6 hours of septic shock presentation after bolus shows Adequate tissue perfusion assessed by non-invasive monitoring     Source control achieved by: IV ABX's.  Cultures negative so far.  Continue current regimen.  Mobilize.    Possibly transition to oral ABx's tomorrow and then home.    Acute hypoxic respiratory failure  Patient with Hypoxic Respiratory failure which is Acute.  she is not on home oxygen. Supplemental oxygen was provided and noted-      .   Signs/symptoms of respiratory failure include- tachypnea, increased work of breathing, and wheezing. Contributing diagnoses includes - Pneumonia Labs and images were reviewed. Patient Has not had a recent ABG. Will treat underlying causes and adjust management of respiratory failure as follows-    -encourage incentive spirometry  -DuoNebs q.6H  -supplemental oxygen to maintain saturation greater than 92%; we will wean off oxygen as tolerated    Hyperlipidemia  Does not appear to be on statin, most likely related to elevated LFT's.      Hyponatremia  Hyponatremia is likely due to Dehydration/hypovolemia. The patient's most recent sodium results are listed below.  Recent Labs     01/12/25  0519 01/13/25  0436 01/14/25  0504   * 137 139       Plan  - Correct the sodium by 4-6mEq in 24 hours.   - Will treat the hyponatremia with IVF's  - Monitor sodium Daily.   Resolved     Anemia  Anemia is likely due to  unsure etiology, but seems to be chronic and  normocytic . Most recent hemoglobin and hematocrit are listed below.  Recent Labs     01/12/25  0519 01/13/25  0738 01/14/25  0504   HGB 9.6* 10.5* 10.3*   HCT 29.7* 33.3* 33.8*       Plan  - Monitor serial CBC: Daily  - Transfuse PRBC if patient becomes hemodynamically unstable, symptomatic or H/H drops below 7/21.  - Patient has not received any PRBC transfusions to date      Benign essential hypertension  Patient's blood pressure range in the last 24 hours was: BP  Min: 130/68  Max: 164/84.The patient's inpatient anti-hypertensive regimen is listed below:  Current Antihypertensives  hydrALAZINE injection 10 mg, Every 8 hours PRN, Intravenous  , Daily, Oral  , Daily, Oral  , Daily, Oral  , 2 times daily with meals, Oral  carvediloL tablet 25 mg, 2 times daily with meals, Oral  NIFEdipine 24 hr tablet 30 mg, Daily, Oral  valsartan tablet 160 mg, Daily, Oral    Plan  - BP is controlled, no changes needed to their regimen    Pneumonia  Patient has a diagnosis of pneumonia. The cause of the pneumonia is suspected to be bacterial in etiology but organism is not known. The pneumonia is stable. The patient has the following signs/symptoms of pneumonia: cough and shortness of breath. The patient does have a current oxygen requirement and the patient does not have a home oxygen requirement. I have reviewed the pertinent imaging. The following cultures have been collected: Blood cultures and Sputum culture The culture results are listed below.     Current antimicrobial regimen consists of the antibiotics listed below. Will monitor patient closely and continue current treatment plan unchanged.    Antibiotics (From admission, onward)      Start     Stop Route Frequency Ordered    01/12/25 1030  cefTRIAXone injection 2 g  (Community Acquired Pneumonia (CAP) - Low MDR Risk)         01/18/25 1029 IV Every 24 hours (non-standard times) 01/12/25 0923    01/12/25 1030  azithromycin (ZITHROMAX) 500 mg in 0.9% NaCl 250 mL IVPB  (admixture device)  (Community Acquired Pneumonia (CAP) - Low MDR Risk)         01/17/25 1029 IV Every 24 hours (non-standard times) 01/12/25 0923            Microbiology Results (last 7 days)       Procedure Component Value Units Date/Time    Culture, Respiratory with Gram Stain [[id number]]     Order Status: No result Specimen: Respiratory     Blood Culture #1 **CANNOT BE ORDERED STAT** [[id number]] Collected: 01/12/25 0651    Order Status: Sent Specimen: Blood from Peripheral, Hand, Left Updated: 01/12/25 0716    Blood Culture #2 **CANNOT BE ORDERED STAT** [[id number]] Collected: 01/12/25 0653    Order Status: Sent Specimen: Blood from Peripheral, Hand, Right Updated: 01/12/25 0716              VTE Risk Mitigation (From admission, onward)           Ordered     enoxaparin injection 40 mg  Daily         01/12/25 0923     IP VTE HIGH RISK PATIENT  Once         01/12/25 0923     Place sequential compression device  Until discontinued         01/12/25 0923                    Discharge Planning   IRVIN: 1/15/2025     Code Status: Full Code   Medical Readiness for Discharge Date:   Discharge Plan A: Home with family (Follow-ups)                        Tyler Villavicencio MD  Department of Hospital Medicine   [facility]

## 2025-01-14 NOTE — PLAN OF CARE
Problem: Adult Inpatient Plan of Care  Goal: Plan of Care Review  Flowsheets (Taken 1/13/2025 1823)  Plan of Care Reviewed With: patient  Goal: Absence of Hospital-Acquired Illness or Injury  Intervention: Identify and Manage Fall Risk  Flowsheets (Taken 1/13/2025 1823)  Safety Promotion/Fall Prevention:   assistive device/personal item within reach   Fall Risk reviewed with patient/family   family expresses understanding of fall risk and prevention   family to remain at bedside   patient expresses understanding of fall risk and prevention  Intervention: Prevent Skin Injury  Flowsheets (Taken 1/13/2025 1823)  Body Position: position changed independently  Skin Protection: incontinence pads utilized  Device Skin Pressure Protection: absorbent pad utilized/changed  Intervention: Prevent and Manage VTE (Venous Thromboembolism) Risk  Flowsheets (Taken 1/13/2025 1823)  VTE Prevention/Management:   ROM (active) performed   ROM (passive) performed  Intervention: Prevent Infection  Flowsheets (Taken 1/13/2025 1823)  Infection Prevention: environmental surveillance performed  Goal: Optimal Comfort and Wellbeing  Intervention: Monitor Pain and Promote Comfort  Flowsheets (Taken 1/13/2025 1823)  Pain Management Interventions: care clustered  Intervention: Provide Person-Centered Care  Flowsheets (Taken 1/13/2025 1823)  Trust Relationship/Rapport:   care explained   questions answered     Problem: Sepsis/Septic Shock  Goal: Absence of Bleeding  Intervention: Monitor and Manage Bleeding  Flowsheets (Taken 1/13/2025 1823)  Bleeding Precautions: blood pressure closely monitored  Goal: Blood Glucose Level Within Targeted Range  Intervention: Optimize Glycemic Control  Flowsheets (Taken 1/13/2025 1823)  Glycemic Management: blood glucose monitored     Problem: Skin Injury Risk Increased  Goal: Skin Health and Integrity  Intervention: Optimize Skin Protection  Flowsheets (Taken 1/13/2025 1823)  Pressure Reduction Devices:  pressure-redistributing mattress utilized  Skin Protection: incontinence pads utilized  Activity Management: Arm raise - L1  Head of Bed (HOB) Positioning: HOB elevated

## 2025-01-14 NOTE — NURSING
Report received from gracie Alex RN. Patient resting quietly, no apparent distress noted at this time. Wheels locked in lowest position, call light within reach, Telemetry monitoring in place.    [facility]  Nurses Note -- 4 Eyes      1/14/2025       Skin assessed on: Q Shift      [x] No Pressure Injuries Present    [x]Prevention Measures Documented    [] Yes LDA  for Pressure Injury Previously documented     [] Yes New Pressure Injury Discovered   [] LDA for New Pressure Injury Added      Attending RN:  Olya Gallardo LPN     Second RN:  JUAN FRANCISCO Alex

## 2025-01-14 NOTE — SUBJECTIVE & OBJECTIVE
Interval History:  No acute event overnight.  Patient still having shortness of breath and remained on supplemental oxygen.  Denied fever, but endorsed persistent cough.    Review of Systems   Constitutional:  Negative for activity change, chills and fever.   Respiratory:  Positive for cough, shortness of breath and wheezing.    Cardiovascular:  Negative for chest pain, palpitations and leg swelling.   Gastrointestinal:  Negative for abdominal distention, abdominal pain, diarrhea and nausea.     Objective:     Vital Signs (Most Recent):  Temp: 97.9 °F (36.6 °C) (01/14/25 1131)  Pulse: 75 (01/14/25 1131)  Resp: 19 (01/14/25 1131)  BP: (!) 163/74 (01/14/25 1131)  SpO2: (!) 93 % (01/14/25 1131) Vital Signs (24h Range):  Temp:  [97.6 °F (36.4 °C)-98.6 °F (37 °C)] 97.9 °F (36.6 °C)  Pulse:  [68-92] 75  Resp:  [18-19] 19  SpO2:  [92 %-97 %] 93 %  BP: (124-169)/(69-83) 163/74     Weight: 91.5 kg (201 lb 11.5 oz)  Body mass index is 31.59 kg/m².    Intake/Output Summary (Last 24 hours) at 1/14/2025 1200  Last data filed at 1/13/2025 1930  Gross per 24 hour   Intake 120 ml   Output 400 ml   Net -280 ml         Physical Exam  Constitutional:       General: She is not in acute distress.     Appearance: She is not ill-appearing, toxic-appearing or diaphoretic.   Cardiovascular:      Rate and Rhythm: Normal rate and regular rhythm.      Pulses: Normal pulses.      Heart sounds: Normal heart sounds. No murmur heard.     No gallop.   Pulmonary:      Effort: Respiratory distress present.      Breath sounds: Wheezing and rhonchi present.   Chest:      Chest wall: No tenderness.   Abdominal:      General: There is no distension.      Palpations: Abdomen is soft. There is mass.      Tenderness: There is no abdominal tenderness. There is no guarding.   Neurological:      Mental Status: She is oriented to person, place, and time. Mental status is at baseline.             Significant Labs: CBC:   Recent Labs   Lab 01/13/25  0738  01/14/25  0504   WBC 18.50* 22.53*   HGB 10.5* 10.3*   HCT 33.3* 33.8*    303     CMP:   Recent Labs   Lab 01/13/25  0436 01/14/25  0504    139   K 4.2 3.9    103   CO2 24 26   GLU 91 104   BUN 13 12   CREATININE 0.7 0.7   CALCIUM 9.6 9.8   ANIONGAP 12 10       Significant Imaging: I have reviewed all pertinent imaging results/findings within the past 24 hours.

## 2025-01-14 NOTE — PLAN OF CARE
01/14/25 0956   Medicare Message   Important Message from Medicare regarding Discharge Appeal Rights Given to patient/caregiver;Explained to patient/caregiver;Signed/date by patient/caregiver   Date IMM was signed 01/14/25   Time IMM was signed 0929

## 2025-01-14 NOTE — PLAN OF CARE
Recommendation:  1. Continue pt on cardiac diet  2. Encourage intake at meals as tolerated  3. Monitor weight/labs  4. RD to follow to monitor PO intake    Goals: Pt to meet 50-75% EEN/EPN by RD follow-up

## 2025-01-14 NOTE — ASSESSMENT & PLAN NOTE
Anemia is likely due to  unsure etiology, but seems to be chronic and normocytic . Most recent hemoglobin and hematocrit are listed below.  Recent Labs     01/12/25  0519 01/13/25  0738 01/14/25  0504   HGB 9.6* 10.5* 10.3*   HCT 29.7* 33.3* 33.8*       Plan  - Monitor serial CBC: Daily  - Transfuse PRBC if patient becomes hemodynamically unstable, symptomatic or H/H drops below 7/21.  - Patient has not received any PRBC transfusions to date

## 2025-01-14 NOTE — ASSESSMENT & PLAN NOTE
Hyponatremia is likely due to Dehydration/hypovolemia. The patient's most recent sodium results are listed below.  Recent Labs     01/12/25  0519 01/13/25  0436 01/14/25  0504   * 137 139       Plan  - Correct the sodium by 4-6mEq in 24 hours.   - Will treat the hyponatremia with IVF's  - Monitor sodium Daily.   Resolved

## 2025-01-14 NOTE — ASSESSMENT & PLAN NOTE
Patient with Hypoxic Respiratory failure which is Acute.  she is not on home oxygen. Supplemental oxygen was provided and noted-      .   Signs/symptoms of respiratory failure include- tachypnea, increased work of breathing, and wheezing. Contributing diagnoses includes - Pneumonia Labs and images were reviewed. Patient Has not had a recent ABG. Will treat underlying causes and adjust management of respiratory failure as follows-    -encourage incentive spirometry  -DuoNebs q.6H  -supplemental oxygen to maintain saturation greater than 92%; we will wean off oxygen as tolerated

## 2025-01-14 NOTE — PLAN OF CARE
Problem: Adult Inpatient Plan of Care  Goal: Plan of Care Review  Outcome: Progressing  Goal: Patient-Specific Goal (Individualized)  Outcome: Progressing  Goal: Absence of Hospital-Acquired Illness or Injury  Outcome: Progressing  Goal: Optimal Comfort and Wellbeing  Outcome: Progressing  Goal: Readiness for Transition of Care  Outcome: Progressing     Problem: Sepsis/Septic Shock  Goal: Optimal Coping  Outcome: Progressing  Goal: Absence of Bleeding  Outcome: Progressing  Goal: Blood Glucose Level Within Targeted Range  Outcome: Progressing  Goal: Absence of Infection Signs and Symptoms  Outcome: Progressing  Goal: Optimal Nutrition Intake  Outcome: Progressing     Problem: Pneumonia  Goal: Fluid Balance  Outcome: Progressing  Goal: Resolution of Infection Signs and Symptoms  Outcome: Progressing  Goal: Effective Oxygenation and Ventilation  Outcome: Progressing     Problem: Skin Injury Risk Increased  Goal: Skin Health and Integrity  Outcome: Progressing

## 2025-01-14 NOTE — PROGRESS NOTES
[facility] [place]  Adult Nutrition  Progress Note    SUMMARY       Recommendations    Recommendation:  1. Continue pt on cardiac diet  2. Encourage intake at meals as tolerated  3. Monitor weight/labs  4. RD to follow to monitor PO intake    Goals:  Pt to meet 50-75% EEN/EPN by RD follow-up  Nutrition Goal Status: new  Communication of RD Recs:  (POC)    Assessment and Plan  Nutrition Problem  Inadequate energy intake    Related to (etiology):   Decreased Appetite    Signs and Symptoms (as evidenced by):   25% intake at meals      Interventions:  Collaboration by nutrition professional with other providers    Nutrition Diagnosis Status:   New    Malnutrition Assessment  Energy Intake (Malnutrition): less than 75% for greater than 7 days   Orbital Region (Subcutaneous Fat Loss): well nourished  Upper Arm Region (Subcutaneous Fat Loss): well nourished  Thoracic and Lumbar Region: well nourished   Druze Region (Muscle Loss): well nourished  Clavicle Bone Region (Muscle Loss): well nourished  Clavicle and Acromion Bone Region (Muscle Loss): well nourished  Scapular Bone Region (Muscle Loss): well nourished  Dorsal Hand (Muscle Loss): well nourished  Patellar Region (Muscle Loss): well nourished  Anterior Thigh Region (Muscle Loss): well nourished  Posterior Calf Region (Muscle Loss): well nourished     Reason for Assessment  Reason For Assessment: identified at risk by screening criteria (MST)  Diagnosis: infection/sepsis (sepsis)  General Information Comments: Pt presented to ER with SOB, cough, congestion, wheezing, frequent watery loose stools. Admitted with sepsis. C/o decreased appetite, poor po intake, and abdominal pain per chart. Currently on cardiac diet, intake 25% per chart. Spoke with pt, reports that she does not eat much s/p gastric bypass surgery in 2003 and has a chronic decreased appetite. Gabriel 19- skin intact. NFPE assessed at this time, pt nourished. No recent wt hx per chart.  Nutrition  "Discharge Planning: d/c on cardiac diet    Nutrition/Diet History  Spiritual, Cultural Beliefs, [employer] Practices, Values that Affect Care: no  Food Allergies: NKFA  Factors Affecting Nutritional Intake: decreased appetite, diarrhea    Anthropometrics  Height: 5' 7" (170.2 cm)  Height (inches): 67 in  Weight: 91.5 kg (201 lb 11.5 oz)  Weight (lb): 201.72 lb  Weight Method: Bed Scale  Ideal Body Weight (IBW), Female: 135 lb  % Ideal Body Weight, Female (lb): 149.42 %  BMI (Calculated): 31.6  BMI Grade: 30 - 34.9- obesity - grade I       Lab/Procedures/Meds  Pertinent Labs Reviewed: reviewed  Pertinent Labs Comments: WBC 22.53(H), RBC 3.64(L), Hemoglobin 10.3(L), Hematocrit 33.8(L)  Pertinent Medications Reviewed: reviewed  Pertinent Medications Comments: zithromax, carvedilol, ceftriaxone, enoxaparin, nifedipine, valsartan,    Estimated/Assessed Needs  Weight Used For Calorie Calculations: 61.2 kg (135 lb) (IBW)  Energy Calorie Requirements (kcal): 1837 kcal  Energy Need Method: Kcal/kg (30)  Protein Requirements: 85 g (1.4 g/kg IBW)  Weight Used For Protein Calculations: 61.2 kg (135 lb) (IBW)  Estimated Fluid Requirement Method: RDA Method  RDA Method (mL): 1837       Nutrition Prescription Ordered  Current Diet Order: cardiac    Evaluation of Received Nutrient/Fluid Intake  I/O: 120/1400  Energy Calories Required: not meeting needs  Protein Required: not meeting needs  Fluid Required: not meeting needs  Comments: LBM: 1/11  Tolerance: tolerating  % Intake of Estimated Energy Needs: 25 - 50 %  % Meal Intake: 25 - 50 %    Nutrition Risk  Level of Risk/Frequency of Follow-up: low (1x weekly)     Monitor and Evaluation  Food and Nutrient Intake: energy intake, food and beverage intake  Food and Nutrient Adminstration: diet order  Physical Activity and Function: nutrition-related ADLs and IADLs  Anthropometric Measurements: body mass index, weight change  Biochemical Data, Medical Tests and Procedures: " glucose/endocrine profile, inflammatory profile, lipid profile  Nutrition-Focused Physical Findings: overall appearance     Nutrition Follow-Up  RD Follow-up?: Yes

## 2025-01-15 LAB
ANION GAP SERPL CALC-SCNC: 8 MMOL/L (ref 8–16)
BASOPHILS # BLD AUTO: 0.06 K/UL (ref 0–0.2)
BASOPHILS NFR BLD: 0.4 % (ref 0–1.9)
BUN SERPL-MCNC: 10 MG/DL (ref 8–23)
CALCIUM SERPL-MCNC: 8.9 MG/DL (ref 8.7–10.5)
CHLORIDE SERPL-SCNC: 101 MMOL/L (ref 95–110)
CO2 SERPL-SCNC: 31 MMOL/L (ref 23–29)
CREAT SERPL-MCNC: 0.7 MG/DL (ref 0.5–1.4)
DIFFERENTIAL METHOD BLD: ABNORMAL
EOSINOPHIL # BLD AUTO: 0.2 K/UL (ref 0–0.5)
EOSINOPHIL NFR BLD: 1.2 % (ref 0–8)
ERYTHROCYTE [DISTWIDTH] IN BLOOD BY AUTOMATED COUNT: 13.1 % (ref 11.5–14.5)
EST. GFR  (NO RACE VARIABLE): >60 ML/MIN/1.73 M^2
GLUCOSE SERPL-MCNC: 83 MG/DL (ref 70–110)
HCT VFR BLD AUTO: 31.1 % (ref 37–48.5)
HGB BLD-MCNC: 9.8 G/DL (ref 12–16)
IMM GRANULOCYTES # BLD AUTO: 0.09 K/UL (ref 0–0.04)
IMM GRANULOCYTES NFR BLD AUTO: 0.6 % (ref 0–0.5)
LYMPHOCYTES # BLD AUTO: 2.4 K/UL (ref 1–4.8)
LYMPHOCYTES NFR BLD: 14.8 % (ref 18–48)
MAGNESIUM SERPL-MCNC: 1.8 MG/DL (ref 1.6–2.6)
MCH RBC QN AUTO: 29.5 PG (ref 27–31)
MCHC RBC AUTO-ENTMCNC: 31.5 G/DL (ref 32–36)
MCV RBC AUTO: 94 FL (ref 82–98)
MONOCYTES # BLD AUTO: 0.9 K/UL (ref 0.3–1)
MONOCYTES NFR BLD: 5.6 % (ref 4–15)
NEUTROPHILS # BLD AUTO: 12.5 K/UL (ref 1.8–7.7)
NEUTROPHILS NFR BLD: 77.4 % (ref 38–73)
NRBC BLD-RTO: 0 /100 WBC
PHOSPHATE SERPL-MCNC: 2.6 MG/DL (ref 2.7–4.5)
PLATELET # BLD AUTO: 362 K/UL (ref 150–450)
PMV BLD AUTO: 9.4 FL (ref 9.2–12.9)
POCT GLUCOSE: 110 MG/DL (ref 70–110)
POCT GLUCOSE: 114 MG/DL (ref 70–110)
POTASSIUM SERPL-SCNC: 3.8 MMOL/L (ref 3.5–5.1)
RBC # BLD AUTO: 3.32 M/UL (ref 4–5.4)
SODIUM SERPL-SCNC: 140 MMOL/L (ref 136–145)
WBC # BLD AUTO: 16.14 K/UL (ref 3.9–12.7)

## 2025-01-15 PROCEDURE — 80048 BASIC METABOLIC PNL TOTAL CA: CPT | Performed by: HOSPITALIST

## 2025-01-15 PROCEDURE — 94761 N-INVAS EAR/PLS OXIMETRY MLT: CPT

## 2025-01-15 PROCEDURE — 21400001 HC TELEMETRY ROOM

## 2025-01-15 PROCEDURE — 25000242 PHARM REV CODE 250 ALT 637 W/ HCPCS

## 2025-01-15 PROCEDURE — 99900035 HC TECH TIME PER 15 MIN (STAT)

## 2025-01-15 PROCEDURE — 83735 ASSAY OF MAGNESIUM: CPT | Performed by: HOSPITALIST

## 2025-01-15 PROCEDURE — 25000003 PHARM REV CODE 250: Performed by: INTERNAL MEDICINE

## 2025-01-15 PROCEDURE — 94664 DEMO&/EVAL PT USE INHALER: CPT

## 2025-01-15 PROCEDURE — 27000221 HC OXYGEN, UP TO 24 HOURS

## 2025-01-15 PROCEDURE — 94640 AIRWAY INHALATION TREATMENT: CPT

## 2025-01-15 PROCEDURE — 36415 COLL VENOUS BLD VENIPUNCTURE: CPT | Performed by: HOSPITALIST

## 2025-01-15 PROCEDURE — 84100 ASSAY OF PHOSPHORUS: CPT | Performed by: HOSPITALIST

## 2025-01-15 PROCEDURE — 25000003 PHARM REV CODE 250: Performed by: HOSPITALIST

## 2025-01-15 PROCEDURE — 85025 COMPLETE CBC W/AUTO DIFF WBC: CPT | Performed by: HOSPITALIST

## 2025-01-15 PROCEDURE — 63600175 PHARM REV CODE 636 W HCPCS: Performed by: HOSPITALIST

## 2025-01-15 RX ADMIN — GUAIFENESIN AND DEXTROMETHORPHAN 5 ML: 100; 10 SYRUP ORAL at 11:01

## 2025-01-15 RX ADMIN — ASPIRIN 81 MG: 81 TABLET, COATED ORAL at 09:01

## 2025-01-15 RX ADMIN — AZITHROMYCIN DIHYDRATE 500 MG: 500 INJECTION, POWDER, LYOPHILIZED, FOR SOLUTION INTRAVENOUS at 09:01

## 2025-01-15 RX ADMIN — VALSARTAN 160 MG: 80 TABLET, FILM COATED ORAL at 09:01

## 2025-01-15 RX ADMIN — IPRATROPIUM BROMIDE AND ALBUTEROL SULFATE 3 ML: 2.5; .5 SOLUTION RESPIRATORY (INHALATION) at 08:01

## 2025-01-15 RX ADMIN — IPRATROPIUM BROMIDE AND ALBUTEROL SULFATE 3 ML: 2.5; .5 SOLUTION RESPIRATORY (INHALATION) at 07:01

## 2025-01-15 RX ADMIN — GUAIFENESIN AND DEXTROMETHORPHAN 5 ML: 100; 10 SYRUP ORAL at 12:01

## 2025-01-15 RX ADMIN — ZIPRASIDONE HYDROCHLORIDE 80 MG: 20 CAPSULE ORAL at 08:01

## 2025-01-15 RX ADMIN — GUAIFENESIN AND DEXTROMETHORPHAN 5 ML: 100; 10 SYRUP ORAL at 05:01

## 2025-01-15 RX ADMIN — ENOXAPARIN SODIUM 40 MG: 40 INJECTION SUBCUTANEOUS at 04:01

## 2025-01-15 RX ADMIN — NIFEDIPINE 30 MG: 30 TABLET, FILM COATED, EXTENDED RELEASE ORAL at 09:01

## 2025-01-15 RX ADMIN — CARVEDILOL 25 MG: 12.5 TABLET, FILM COATED ORAL at 04:01

## 2025-01-15 RX ADMIN — IPRATROPIUM BROMIDE AND ALBUTEROL SULFATE 3 ML: 2.5; .5 SOLUTION RESPIRATORY (INHALATION) at 12:01

## 2025-01-15 RX ADMIN — CARVEDILOL 25 MG: 12.5 TABLET, FILM COATED ORAL at 09:01

## 2025-01-15 RX ADMIN — IPRATROPIUM BROMIDE AND ALBUTEROL SULFATE 3 ML: 2.5; .5 SOLUTION RESPIRATORY (INHALATION) at 02:01

## 2025-01-15 RX ADMIN — CEFTRIAXONE 2 G: 2 INJECTION, POWDER, FOR SOLUTION INTRAMUSCULAR; INTRAVENOUS at 09:01

## 2025-01-15 NOTE — PLAN OF CARE
Problem: Adult Inpatient Plan of Care  Goal: Plan of Care Review  Flowsheets (Taken 1/14/2025 1801)  Plan of Care Reviewed With:   patient   child  Goal: Absence of Hospital-Acquired Illness or Injury  Intervention: Identify and Manage Fall Risk  Flowsheets (Taken 1/14/2025 1801)  Safety Promotion/Fall Prevention:   assistive device/personal item within reach   chair alarm set   bed alarm set   Fall Risk reviewed with patient/family  Intervention: Prevent Skin Injury  Flowsheets (Taken 1/14/2025 1801)  Body Position: position changed independently  Skin Protection: incontinence pads utilized  Device Skin Pressure Protection: absorbent pad utilized/changed  Intervention: Prevent and Manage VTE (Venous Thromboembolism) Risk  Flowsheets (Taken 1/14/2025 1801)  VTE Prevention/Management: ambulation promoted  Intervention: Prevent Infection  Flowsheets (Taken 1/14/2025 1801)  Infection Prevention: environmental surveillance performed  Goal: Optimal Comfort and Wellbeing  Intervention: Monitor Pain and Promote Comfort  Flowsheets (Taken 1/14/2025 1801)  Pain Management Interventions: care clustered  Intervention: Provide Person-Centered Care  Flowsheets (Taken 1/14/2025 1801)  Trust Relationship/Rapport:   care explained   questions answered     Problem: Sepsis/Septic Shock  Goal: Optimal Coping  Intervention: Optimize Psychosocial Adjustment to Illness  Flowsheets (Taken 1/14/2025 1801)  Supportive Measures: active listening utilized

## 2025-01-15 NOTE — PLAN OF CARE
Problem: Adult Inpatient Plan of Care  Goal: Plan of Care Review  1/15/2025 0315 by Nicole Da Silva RN  Outcome: Progressing  1/15/2025 0314 by Nicole Da Silva RN  Outcome: Progressing  Goal: Patient-Specific Goal (Individualized)  1/15/2025 0315 by Nicole Da Silva RN  Outcome: Progressing  1/15/2025 0314 by Nicole Da Silva RN  Outcome: Progressing

## 2025-01-15 NOTE — PLAN OF CARE
Changes in medical condition or discharge plan:    Per attending physician, plan to wean pt off oxygen today and discharge home tomorrow.    Does patient need new DME? TBD    Follow up appts needed: PCP    Medically stable: not at this time    Estimated Discharge Date: 1/16     01/15/25 1234   Discharge Reassessment   Assessment Type Discharge Planning Reassessment   Did the patient's condition or plan change since previous assessment? No   Discharge Plan discussed with: Patient   Communicated IRVIN with patient/caregiver Yes   Discharge Plan A Home with family   Discharge Plan B Home   DME Needed Upon Discharge  other (see comments)  (TBD)   Transition of Care Barriers None   Why the patient remains in the hospital Requires continued medical care   Post-Acute Status   Coverage PHN   Discharge Delays None known at this time

## 2025-01-15 NOTE — PROGRESS NOTES
[facility] Medicine  Progress Note    Patient Name: Vandana López  MRN: [id number]  Patient Class: IP- Inpatient   Admission Date: 1/12/2025  Length of Stay: 3 days  Attending Physician: Tyler Villavicencio MD  Primary Care Provider: Alfreda, Primary Doctor        Subjective     Principal Problem:Sepsis        HPI:  75 y/o female with past medical history of HTN, HLP presents with shortness of breath.  Patient complains of 3 days of shortness of breath with associated cough, congestion and wheezing.  She also complains of diarrhea.  States multiple grandchildren with URI symptoms, another one of her grand children with diarrhea.  She was experiencing some associated right-sided abdominal pain, which has resolved. Patient states decreased appetite and intake since onset of symptoms.  Some improvement of wheezing with inhaler, but no improvement of shortness of breath.  Dyspnea is both at rest and on exertion.  No alleviating factors.  Denies any fever or chills.  No other complaints.    Overview/Hospital Course:  75 y/o female presents with shortness of breath.  Admitted with pneumonia and started on IV ABx's.  Patient markedly improved compared to on presentation.  Still on 4 L; start weaning off oxygen.  Anticipated discharge on 01/16 if no supplemental oxygen requirement    Interval History:  No acute event overnight.  Patient seen sitting in a chair with family.  Still having residual cough but denied any overt shortness of breath.  No fever.  Stated that she feels much better compared to on presentation    Review of Systems   Constitutional:  Negative for activity change, chills, diaphoresis and fatigue.   Respiratory:  Positive for cough. Negative for shortness of breath and wheezing.    Cardiovascular:  Negative for chest pain, palpitations and leg swelling.   Gastrointestinal:  Negative for abdominal distention, abdominal pain, diarrhea and nausea.     Objective:     Vital Signs (Most  Recent):  Temp: 98 °F (36.7 °C) (01/15/25 1101)  Pulse: 85 (01/15/25 1520)  Resp: 18 (01/15/25 1437)  BP: (!) 141/66 (01/15/25 1101)  SpO2: 100 % (01/15/25 1437) Vital Signs (24h Range):  Temp:  [98 °F (36.7 °C)-98.9 °F (37.2 °C)] 98 °F (36.7 °C)  Pulse:  [] 85  Resp:  [18-20] 18  SpO2:  [90 %-100 %] 100 %  BP: (135-185)/(64-88) 141/66     Weight: 91.5 kg (201 lb 11.5 oz)  Body mass index is 31.59 kg/m².    Intake/Output Summary (Last 24 hours) at 1/15/2025 1552  Last data filed at 1/15/2025 1200  Gross per 24 hour   Intake 1096.43 ml   Output --   Net 1096.43 ml         Physical Exam  Constitutional:       General: She is not in acute distress.     Appearance: Normal appearance. She is not ill-appearing, toxic-appearing or diaphoretic.   Neurological:      Mental Status: She is alert.             Significant Labs: All pertinent labs within the past 24 hours have been reviewed.    Significant Imaging: I have reviewed all pertinent imaging results/findings within the past 24 hours.    Assessment and Plan     * Sepsis  This patient does have evidence of infective focus  My overall impression is sepsis.  Source: Respiratory  Antibiotics given-   Antibiotics (72h ago, onward)      Start     Stop Route Frequency Ordered    01/12/25 1030  cefTRIAXone injection 2 g  (Community Acquired Pneumonia (CAP) - Low MDR Risk)         01/18/25 1029 IV Every 24 hours (non-standard times) 01/12/25 0923    01/12/25 1030  azithromycin (ZITHROMAX) 500 mg in 0.9% NaCl 250 mL IVPB (admixture device)  (Community Acquired Pneumonia (CAP) - Low MDR Risk)         01/17/25 1029 IV Every 24 hours (non-standard times) 01/12/25 0923          Latest lactate reviewed-  Recent Labs   Lab 01/12/25  0612   LACTATE 1.1       Fluid challenge Actual Body weight- Patient will receive 30ml/kg actual body weight to calculate fluid bolus for treatment of septic shock.     Post- resuscitation assessment Yes Perfusion exam was performed within 6 hours of  septic shock presentation after bolus shows Adequate tissue perfusion assessed by non-invasive monitoring     Source control achieved by: IV ABX's.  Cultures negative so far.  Continue current regimen.  Mobilize.    Possibly transition to oral ABx's tomorrow and then home.    Acute hypoxic respiratory failure  Patient with Hypoxic Respiratory failure which is Acute.  she is not on home oxygen. Supplemental oxygen was provided and noted-      .   Signs/symptoms of respiratory failure include- tachypnea, increased work of breathing, and wheezing. Contributing diagnoses includes - Pneumonia Labs and images were reviewed. Patient Has not had a recent ABG. Will treat underlying causes and adjust management of respiratory failure as follows-    -encourage incentive spirometry  -DuoNebs q.6H  -supplemental oxygen to maintain saturation greater than 92%; we will wean off oxygen as tolerated    Hyperlipidemia  Does not appear to be on statin, most likely related to elevated LFT's.      Hyponatremia  Hyponatremia is likely due to Dehydration/hypovolemia. The patient's most recent sodium results are listed below.  Recent Labs     01/12/25  0519 01/13/25  0436 01/14/25  0504   * 137 139       Plan  - Correct the sodium by 4-6mEq in 24 hours.   - Will treat the hyponatremia with IVF's  - Monitor sodium Daily.   Resolved     Anemia  Anemia is likely due to  unsure etiology, but seems to be chronic and normocytic . Most recent hemoglobin and hematocrit are listed below.  Recent Labs     01/12/25  0519 01/13/25  0738 01/14/25  0504   HGB 9.6* 10.5* 10.3*   HCT 29.7* 33.3* 33.8*       Plan  - Monitor serial CBC: Daily  - Transfuse PRBC if patient becomes hemodynamically unstable, symptomatic or H/H drops below 7/21.  - Patient has not received any PRBC transfusions to date      Benign essential hypertension  Patient's blood pressure range in the last 24 hours was: BP  Min: 130/68  Max: 164/84.The patient's inpatient  anti-hypertensive regimen is listed below:  Current Antihypertensives  hydrALAZINE injection 10 mg, Every 8 hours PRN, Intravenous  , Daily, Oral  , Daily, Oral  , Daily, Oral  , 2 times daily with meals, Oral  carvediloL tablet 25 mg, 2 times daily with meals, Oral  NIFEdipine 24 hr tablet 30 mg, Daily, Oral  valsartan tablet 160 mg, Daily, Oral    Plan  - BP is controlled, no changes needed to their regimen    Pneumonia  Patient has a diagnosis of pneumonia. The cause of the pneumonia is suspected to be bacterial in etiology but organism is not known. The pneumonia is stable. The patient has the following signs/symptoms of pneumonia: cough and shortness of breath. The patient does have a current oxygen requirement and the patient does not have a home oxygen requirement. I have reviewed the pertinent imaging. The following cultures have been collected: Blood cultures and Sputum culture The culture results are listed below.     Current antimicrobial regimen consists of the antibiotics listed below. Will monitor patient closely and continue current treatment plan unchanged.    Antibiotics (From admission, onward)      Start     Stop Route Frequency Ordered    01/12/25 1030  cefTRIAXone injection 2 g  (Community Acquired Pneumonia (CAP) - Low MDR Risk)         01/18/25 1029 IV Every 24 hours (non-standard times) 01/12/25 0923    01/12/25 1030  azithromycin (ZITHROMAX) 500 mg in 0.9% NaCl 250 mL IVPB (admixture device)  (Community Acquired Pneumonia (CAP) - Low MDR Risk)         01/17/25 1029 IV Every 24 hours (non-standard times) 01/12/25 0923            Microbiology Results (last 7 days)       Procedure Component Value Units Date/Time    Culture, Respiratory with Gram Stain [[id number]]     Order Status: No result Specimen: Respiratory     Blood Culture #1 **CANNOT BE ORDERED STAT** [[id number]] Collected: 01/12/25 0651    Order Status: Sent Specimen: Blood from Peripheral, Hand, Left Updated: 01/12/25 0716    Blood  Culture #2 **CANNOT BE ORDERED STAT** [[id number]] Collected: 01/12/25 0653    Order Status: Sent Specimen: Blood from Peripheral, Hand, Right Updated: 01/12/25 0716              VTE Risk Mitigation (From admission, onward)           Ordered     enoxaparin injection 40 mg  Daily         01/12/25 0923     IP VTE HIGH RISK PATIENT  Once         01/12/25 0923     Place sequential compression device  Until discontinued         01/12/25 0923                    Discharge Planning   IRVIN: 1/16/2025     Code Status: Full Code   Medical Readiness for Discharge Date:   Discharge Plan A: Home with family   Discharge Delays: None known at this time                    Tyler Villavicencio MD  Department of Hospital Medicine   [facility]

## 2025-01-15 NOTE — SUBJECTIVE & OBJECTIVE
Interval History:  No acute event overnight.  Patient seen sitting in a chair with family.  Still having residual cough but denied any overt shortness of breath.  No fever.  Stated that she feels much better compared to on presentation    Review of Systems   Constitutional:  Negative for activity change, chills, diaphoresis and fatigue.   Respiratory:  Positive for cough. Negative for shortness of breath and wheezing.    Cardiovascular:  Negative for chest pain, palpitations and leg swelling.   Gastrointestinal:  Negative for abdominal distention, abdominal pain, diarrhea and nausea.     Objective:     Vital Signs (Most Recent):  Temp: 98 °F (36.7 °C) (01/15/25 1101)  Pulse: 85 (01/15/25 1520)  Resp: 18 (01/15/25 1437)  BP: (!) 141/66 (01/15/25 1101)  SpO2: 100 % (01/15/25 1437) Vital Signs (24h Range):  Temp:  [98 °F (36.7 °C)-98.9 °F (37.2 °C)] 98 °F (36.7 °C)  Pulse:  [] 85  Resp:  [18-20] 18  SpO2:  [90 %-100 %] 100 %  BP: (135-185)/(64-88) 141/66     Weight: 91.5 kg (201 lb 11.5 oz)  Body mass index is 31.59 kg/m².    Intake/Output Summary (Last 24 hours) at 1/15/2025 1552  Last data filed at 1/15/2025 1200  Gross per 24 hour   Intake 1096.43 ml   Output --   Net 1096.43 ml         Physical Exam  Constitutional:       General: She is not in acute distress.     Appearance: Normal appearance. She is not ill-appearing, toxic-appearing or diaphoretic.   Neurological:      Mental Status: She is alert.             Significant Labs: All pertinent labs within the past 24 hours have been reviewed.    Significant Imaging: I have reviewed all pertinent imaging results/findings within the past 24 hours.

## 2025-01-16 VITALS
WEIGHT: 201.75 LBS | SYSTOLIC BLOOD PRESSURE: 148 MMHG | OXYGEN SATURATION: 93 % | DIASTOLIC BLOOD PRESSURE: 90 MMHG | HEART RATE: 72 BPM | TEMPERATURE: 99 F | HEIGHT: 67 IN | RESPIRATION RATE: 18 BRPM | BODY MASS INDEX: 31.66 KG/M2

## 2025-01-16 LAB
ALBUMIN SERPL BCP-MCNC: 2.6 G/DL (ref 3.5–5.2)
ALP SERPL-CCNC: 186 U/L (ref 40–150)
ALT SERPL W/O P-5'-P-CCNC: 29 U/L (ref 10–44)
ANION GAP SERPL CALC-SCNC: 10 MMOL/L (ref 8–16)
AST SERPL-CCNC: 20 U/L (ref 10–40)
BACTERIA BLD CULT: NORMAL
BACTERIA BLD CULT: NORMAL
BASOPHILS # BLD AUTO: 0.11 K/UL (ref 0–0.2)
BASOPHILS NFR BLD: 0.8 % (ref 0–1.9)
BILIRUB SERPL-MCNC: 0.3 MG/DL (ref 0.1–1)
BUN SERPL-MCNC: 9 MG/DL (ref 8–23)
CALCIUM SERPL-MCNC: 9.6 MG/DL (ref 8.7–10.5)
CHLORIDE SERPL-SCNC: 102 MMOL/L (ref 95–110)
CO2 SERPL-SCNC: 34 MMOL/L (ref 23–29)
CREAT SERPL-MCNC: 0.7 MG/DL (ref 0.5–1.4)
DIFFERENTIAL METHOD BLD: ABNORMAL
EOSINOPHIL # BLD AUTO: 0.3 K/UL (ref 0–0.5)
EOSINOPHIL NFR BLD: 2.2 % (ref 0–8)
ERYTHROCYTE [DISTWIDTH] IN BLOOD BY AUTOMATED COUNT: 13.2 % (ref 11.5–14.5)
EST. GFR  (NO RACE VARIABLE): >60 ML/MIN/1.73 M^2
GLUCOSE SERPL-MCNC: 97 MG/DL (ref 70–110)
HCT VFR BLD AUTO: 33.5 % (ref 37–48.5)
HGB BLD-MCNC: 10.4 G/DL (ref 12–16)
IMM GRANULOCYTES # BLD AUTO: 0.08 K/UL (ref 0–0.04)
IMM GRANULOCYTES NFR BLD AUTO: 0.6 % (ref 0–0.5)
LYMPHOCYTES # BLD AUTO: 2.1 K/UL (ref 1–4.8)
LYMPHOCYTES NFR BLD: 15.4 % (ref 18–48)
MAGNESIUM SERPL-MCNC: 1.5 MG/DL (ref 1.6–2.6)
MCH RBC QN AUTO: 28.8 PG (ref 27–31)
MCHC RBC AUTO-ENTMCNC: 31 G/DL (ref 32–36)
MCV RBC AUTO: 93 FL (ref 82–98)
MONOCYTES # BLD AUTO: 0.8 K/UL (ref 0.3–1)
MONOCYTES NFR BLD: 5.8 % (ref 4–15)
NEUTROPHILS # BLD AUTO: 10.2 K/UL (ref 1.8–7.7)
NEUTROPHILS NFR BLD: 75.2 % (ref 38–73)
NRBC BLD-RTO: 0 /100 WBC
PHOSPHATE SERPL-MCNC: 2.7 MG/DL (ref 2.7–4.5)
PLATELET # BLD AUTO: 391 K/UL (ref 150–450)
PMV BLD AUTO: 9.1 FL (ref 9.2–12.9)
POCT GLUCOSE: 105 MG/DL (ref 70–110)
POTASSIUM SERPL-SCNC: 3.8 MMOL/L (ref 3.5–5.1)
PROT SERPL-MCNC: 7.4 G/DL (ref 6–8.4)
RBC # BLD AUTO: 3.61 M/UL (ref 4–5.4)
SODIUM SERPL-SCNC: 146 MMOL/L (ref 136–145)
WBC # BLD AUTO: 13.55 K/UL (ref 3.9–12.7)

## 2025-01-16 PROCEDURE — 63600175 PHARM REV CODE 636 W HCPCS

## 2025-01-16 PROCEDURE — 94760 N-INVAS EAR/PLS OXIMETRY 1: CPT

## 2025-01-16 PROCEDURE — 94761 N-INVAS EAR/PLS OXIMETRY MLT: CPT

## 2025-01-16 PROCEDURE — 80053 COMPREHEN METABOLIC PANEL: CPT

## 2025-01-16 PROCEDURE — 25000242 PHARM REV CODE 250 ALT 637 W/ HCPCS

## 2025-01-16 PROCEDURE — 25000003 PHARM REV CODE 250: Performed by: HOSPITALIST

## 2025-01-16 PROCEDURE — 94640 AIRWAY INHALATION TREATMENT: CPT

## 2025-01-16 PROCEDURE — 99900035 HC TECH TIME PER 15 MIN (STAT)

## 2025-01-16 PROCEDURE — 99900031 HC PATIENT EDUCATION (STAT)

## 2025-01-16 PROCEDURE — 36415 COLL VENOUS BLD VENIPUNCTURE: CPT

## 2025-01-16 PROCEDURE — 27000221 HC OXYGEN, UP TO 24 HOURS

## 2025-01-16 PROCEDURE — 84100 ASSAY OF PHOSPHORUS: CPT

## 2025-01-16 PROCEDURE — 63600175 PHARM REV CODE 636 W HCPCS: Performed by: HOSPITALIST

## 2025-01-16 PROCEDURE — 94664 DEMO&/EVAL PT USE INHALER: CPT

## 2025-01-16 PROCEDURE — 85025 COMPLETE CBC W/AUTO DIFF WBC: CPT

## 2025-01-16 PROCEDURE — 83735 ASSAY OF MAGNESIUM: CPT

## 2025-01-16 RX ORDER — AMOXICILLIN AND CLAVULANATE POTASSIUM 875; 125 MG/1; MG/1
1 TABLET, FILM COATED ORAL EVERY 12 HOURS
Qty: 5 TABLET | Refills: 0 | Status: SHIPPED | OUTPATIENT
Start: 2025-01-16 | End: 2025-01-19

## 2025-01-16 RX ORDER — GUAIFENESIN AND DEXTROMETHORPHAN HYDROBROMIDE 10; 100 MG/5ML; MG/5ML
5 SYRUP ORAL EVERY 6 HOURS
Qty: 30 ML | Refills: 0 | Status: SHIPPED | OUTPATIENT
Start: 2025-01-16 | End: 2025-01-21

## 2025-01-16 RX ORDER — MAGNESIUM SULFATE HEPTAHYDRATE 40 MG/ML
2 INJECTION, SOLUTION INTRAVENOUS ONCE
Status: COMPLETED | OUTPATIENT
Start: 2025-01-16 | End: 2025-01-16

## 2025-01-16 RX ADMIN — CARVEDILOL 25 MG: 12.5 TABLET, FILM COATED ORAL at 09:01

## 2025-01-16 RX ADMIN — GUAIFENESIN AND DEXTROMETHORPHAN 5 ML: 100; 10 SYRUP ORAL at 11:01

## 2025-01-16 RX ADMIN — IPRATROPIUM BROMIDE AND ALBUTEROL SULFATE 3 ML: 2.5; .5 SOLUTION RESPIRATORY (INHALATION) at 01:01

## 2025-01-16 RX ADMIN — ASPIRIN 81 MG: 81 TABLET, COATED ORAL at 09:01

## 2025-01-16 RX ADMIN — GUAIFENESIN AND DEXTROMETHORPHAN 5 ML: 100; 10 SYRUP ORAL at 05:01

## 2025-01-16 RX ADMIN — MAGNESIUM SULFATE HEPTAHYDRATE 2 G: 40 INJECTION, SOLUTION INTRAVENOUS at 11:01

## 2025-01-16 RX ADMIN — IPRATROPIUM BROMIDE AND ALBUTEROL SULFATE 3 ML: 2.5; .5 SOLUTION RESPIRATORY (INHALATION) at 07:01

## 2025-01-16 RX ADMIN — VALSARTAN 160 MG: 80 TABLET, FILM COATED ORAL at 09:01

## 2025-01-16 RX ADMIN — CARVEDILOL 25 MG: 12.5 TABLET, FILM COATED ORAL at 04:01

## 2025-01-16 RX ADMIN — AZITHROMYCIN DIHYDRATE 500 MG: 500 INJECTION, POWDER, LYOPHILIZED, FOR SOLUTION INTRAVENOUS at 09:01

## 2025-01-16 RX ADMIN — NIFEDIPINE 30 MG: 30 TABLET, FILM COATED, EXTENDED RELEASE ORAL at 09:01

## 2025-01-16 RX ADMIN — CEFTRIAXONE 2 G: 2 INJECTION, POWDER, FOR SOLUTION INTRAMUSCULAR; INTRAVENOUS at 09:01

## 2025-01-16 NOTE — NURSING
[facility] [place]  Nurses Note -- 4 Eyes      1/15/2025       Skin assessed on: Q Shift      [x] No Pressure Injuries Present    [x]Prevention Measures Documented    [] Yes LDA  for Pressure Injury Previously documented     [] Yes New Pressure Injury Discovered   [] LDA for New Pressure Injury Added      Attending RN:  Virgilio Tipton RN     Second RN:  JUAN FRANCISCO Zamudio

## 2025-01-16 NOTE — DISCHARGE INSTRUCTIONS
Our goal at [facility] is to always give you outstanding care and exceptional service. You may receive a survey by mail, text or e-mail in the next 7-10 days from Liam Menard and our leadership team asking about the care you received with us. The survey should only take 5-10 minutes to complete and is very important to us.     Your feedback provides us with a way to recognize our staff who work tirelessly to provide the best care! Also, your responses help us learn how to improve when your experience was below our aspiration of excellence. We WILL use your feedback to continue making improvements to help us provide the highest quality care. We keep your personal information and feedback confidential. We appreciate your time completing this survey and can't wait to hear from you!!!     We want you to leave us today feeling like you can DEFINITELY recommend us to others! We look forward to your continued care with us! Thanks so much for choosing [facility] for your healthcare needs!

## 2025-01-16 NOTE — PLAN OF CARE
Problem: Adult Inpatient Plan of Care  Goal: Plan of Care Review  Outcome: Progressing     Problem: Pneumonia  Goal: Fluid Balance  Outcome: Progressing  Goal: Effective Oxygenation and Ventilation  Outcome: Progressing     Problem: Skin Injury Risk Increased  Goal: Skin Health and Integrity  Outcome: Progressing     Problem: Fall Injury Risk  Goal: Absence of Fall and Fall-Related Injury  Outcome: Progressing

## 2025-01-16 NOTE — PLAN OF CARE
Case Management Final Discharge Note      Discharge Disposition: Home    New DME ordered / company name: None    Relevant SDOH / Transition of Care Barriers:  None identified    Person available to provide assistance at home when needed and their contact information: Marlene López, daughter. [phone]    Scheduled followup appointment: Pt to [place] [facility] [place] [facility]    Referrals placed: family practice, cardiac EP    Transportation: private vehicle    Patient educated on discharge services and updated on DC plan. Bedside RN notified, patient clear to discharge from Case Management Perspective.      01/16/25 1356   Final Note   Assessment Type Final Discharge Note   Anticipated Discharge Disposition Home   Hospital Resources/Appts/Education Provided Appointments scheduled and added to AVS   Post-Acute Status   Coverage PHN   Discharge Delays None known at this time

## 2025-01-17 NOTE — NURSING
Patient escorted to vehicle via w/c for discharge home. Patient escorted by transport and accompanied by family. No apparent distress noted.   Cab has been called for patient's transportation.

## 2025-01-17 NOTE — DISCHARGE SUMMARY
[facility] Medicine  Discharge Summary      Patient Name: Vandana López  MRN: [id number]  [facility]: [id number]  Patient Class: IP- Inpatient  Admission Date: 1/12/2025  Hospital Length of Stay: 4 days  Discharge Date and Time:  01/16/2025 6:57 PM  Attending Physician: Tyler Villavicencio MD   Discharging Provider: Tyler Villavicencio MD  Primary Care Provider: [facility] Primary Doctor    Primary Care Team: Networked reference to record PCT     HPI:   73 y/o female with past medical history of HTN, HLP presents with shortness of breath.  Patient complains of 3 days of shortness of breath with associated cough, congestion and wheezing.  She also complains of diarrhea.  States multiple grandchildren with URI symptoms, another one of her grand children with diarrhea.  She was experiencing some associated right-sided abdominal pain, which has resolved. Patient states decreased appetite and intake since onset of symptoms.  Some improvement of wheezing with inhaler, but no improvement of shortness of breath.  Dyspnea is both at rest and on exertion.  No alleviating factors.  Denies any fever or chills.  No other complaints.    * No surgery found *      Hospital Course:   73 y/o female with past medical history of HTN, HLP presents with shortness of breath.  Patient complains of 3 days of shortness of breath with associated cough, congestion and wheezing.  She also complains of diarrhea.  States multiple grandchildren with URI symptoms, another one of her grand children with diarrhea.  She was experiencing some associated right-sided abdominal pain, which has resolved. Patient states decreased appetite and intake since onset of symptoms.  Some improvement of wheezing with inhaler, but no improvement of shortness of breath.  Dyspnea is both at rest and on exertion. Patient was admitted with pneumonia and started on IV ABx's.  Patient markedly improved compared to on presentation; acute hypoxic respiratory failure  completely resolved.  Patient was discharged on oral antibiotics and instructed to come back to the hospital in the event of fever, lethargy, or shortness of breath.  All patient's questions were answered to her satisfaction and she verbalized understanding.  Patient was also instructed to follow up with her primary care provider     Goals of Care Treatment Preferences:  Code Status: Full Code      SDOH Screening:  The patient was screened for food insecurity, housing instability, transportation needs, utility difficulties, and interpersonal safety. The social determinant(s) of health identified as a concern this admission are:  Food insecurity        Social Drivers of Health with Concerns     Food Insecurity: Food Insecurity Present (1/13/2025)        Consults:     * Sepsis  This patient does have evidence of infective focus  My overall impression is sepsis.  Source: Respiratory  Antibiotics given-   Antibiotics (72h ago, onward)      Start     Stop Route Frequency Ordered    01/12/25 1030  cefTRIAXone injection 2 g  (Community Acquired Pneumonia (CAP) - Low MDR Risk)         01/18/25 1029 IV Every 24 hours (non-standard times) 01/12/25 0923    01/12/25 1030  azithromycin (ZITHROMAX) 500 mg in 0.9% NaCl 250 mL IVPB (admixture device)  (Community Acquired Pneumonia (CAP) - Low MDR Risk)         01/17/25 1029 IV Every 24 hours (non-standard times) 01/12/25 0923          Latest lactate reviewed-  Recent Labs   Lab 01/12/25  0612   LACTATE 1.1       Fluid challenge Actual Body weight- Patient will receive 30ml/kg actual body weight to calculate fluid bolus for treatment of septic shock.     Post- resuscitation assessment Yes Perfusion exam was performed within 6 hours of septic shock presentation after bolus shows Adequate tissue perfusion assessed by non-invasive monitoring     Source control achieved by: IV ABX's.  Cultures negative so far.  Continue current regimen.  Mobilize.    Possibly transition to oral ABx's  tomorrow and then home.    Acute hypoxic respiratory failure  Patient with Hypoxic Respiratory failure which is Acute.  she is not on home oxygen. Supplemental oxygen was provided and noted-      .   Signs/symptoms of respiratory failure include- tachypnea, increased work of breathing, and wheezing. Contributing diagnoses includes - Pneumonia Labs and images were reviewed. Patient Has not had a recent ABG. Will treat underlying causes and adjust management of respiratory failure as follows-    -encourage incentive spirometry  -DuoNebs q.6H  -supplemental oxygen to maintain saturation greater than 92%; we will wean off oxygen as tolerated    Hyperlipidemia  Does not appear to be on statin, most likely related to elevated LFT's.      Hyponatremia  Hyponatremia is likely due to Dehydration/hypovolemia. The patient's most recent sodium results are listed below.  Recent Labs     01/12/25  0519 01/13/25  0436 01/14/25  0504   * 137 139       Plan  - Correct the sodium by 4-6mEq in 24 hours.   - Will treat the hyponatremia with IVF's  - Monitor sodium Daily.   Resolved     Anemia  Anemia is likely due to  unsure etiology, but seems to be chronic and normocytic . Most recent hemoglobin and hematocrit are listed below.  Recent Labs     01/12/25  0519 01/13/25  0738 01/14/25  0504   HGB 9.6* 10.5* 10.3*   HCT 29.7* 33.3* 33.8*       Plan  - Monitor serial CBC: Daily  - Transfuse PRBC if patient becomes hemodynamically unstable, symptomatic or H/H drops below 7/21.  - Patient has not received any PRBC transfusions to date      Benign essential hypertension  Patient's blood pressure range in the last 24 hours was: BP  Min: 130/68  Max: 164/84.The patient's inpatient anti-hypertensive regimen is listed below:  Current Antihypertensives  hydrALAZINE injection 10 mg, Every 8 hours PRN, Intravenous  , Daily, Oral  , Daily, Oral  , Daily, Oral  , 2 times daily with meals, Oral  carvediloL tablet 25 mg, 2 times daily with  meals, Oral  NIFEdipine 24 hr tablet 30 mg, Daily, Oral  valsartan tablet 160 mg, Daily, Oral    Plan  - BP is controlled, no changes needed to their regimen    Pneumonia  Patient has a diagnosis of pneumonia. The cause of the pneumonia is suspected to be bacterial in etiology but organism is not known. The pneumonia is stable. The patient has the following signs/symptoms of pneumonia: cough and shortness of breath. The patient does have a current oxygen requirement and the patient does not have a home oxygen requirement. I have reviewed the pertinent imaging. The following cultures have been collected: Blood cultures and Sputum culture The culture results are listed below.     Current antimicrobial regimen consists of the antibiotics listed below. Will monitor patient closely and continue current treatment plan unchanged.    Antibiotics (From admission, onward)      Start     Stop Route Frequency Ordered    01/12/25 1030  cefTRIAXone injection 2 g  (Community Acquired Pneumonia (CAP) - Low MDR Risk)         01/18/25 1029 IV Every 24 hours (non-standard times) 01/12/25 0923    01/12/25 1030  azithromycin (ZITHROMAX) 500 mg in 0.9% NaCl 250 mL IVPB (admixture device)  (Community Acquired Pneumonia (CAP) - Low MDR Risk)         01/17/25 1029 IV Every 24 hours (non-standard times) 01/12/25 0923            Microbiology Results (last 7 days)       Procedure Component Value Units Date/Time    Culture, Respiratory with Gram Stain [[id number]]     Order Status: No result Specimen: Respiratory     Blood Culture #1 **CANNOT BE ORDERED STAT** [[id number]] Collected: 01/12/25 0651    Order Status: Sent Specimen: Blood from Peripheral, Hand, Left Updated: 01/12/25 0716    Blood Culture #2 **CANNOT BE ORDERED STAT** [[id number]] Collected: 01/12/25 0653    Order Status: Sent Specimen: Blood from Peripheral, Hand, Right Updated: 01/12/25 0716              Final Active Diagnoses:    Diagnosis Date Noted POA    PRINCIPAL PROBLEM:   Sepsis [A41.9] 01/12/2025 Yes    Acute hypoxic respiratory failure [J96.01] 01/14/2025 Yes    Pneumonia [J18.9] 01/12/2025 Yes    Benign essential hypertension [I10] 01/12/2025 Yes     Chronic    Anemia [D64.9] 01/12/2025 Yes    Hyponatremia [E87.1] 01/12/2025 Yes    Hyperlipidemia [E78.5] 01/12/2025 Yes     Chronic      Problems Resolved During this Admission:       Discharged Condition: stable    Disposition: Home or Self Care    Follow Up:   Follow-up Information       [facility]. Schedule an appointment as soon as possible for a visit in 1 week(s).    Why: [facility] Follow-up needed in 1 week  Contact information:  [facility] [facility] [place] [address] [phone]                         Patient Instructions:      ACCEPT - Ambulatory referral/consult to Cardiac Electrophysiology   Standing Status: Future   Referral Priority: Routine Referral Type: Consultation   Referral Reason: Specialty Services Required   Requested Specialty: Cardiology   Number of Visits Requested: 1     Ambulatory referral/consult to Family Practice   Standing Status: Future   Referral Priority: Routine Referral Type: Consultation   Referral Reason: Specialty Services Required   Requested Specialty: Family Medicine   Number of Visits Requested: 1     Diet Cardiac     Notify your health care provider if you experience any of the following:  temperature >100.4     Notify your health care provider if you experience any of the following:  difficulty breathing or increased cough     Activity as tolerated       Significant Diagnostic Studies: N/A    Pending Diagnostic Studies:       None           Medications:  Reconciled Home Medications:      Medication List        START taking these medications      amoxicillin-clavulanate 875-125mg 875-125 mg per tablet  Commonly known as: AUGMENTIN  Take 1 tablet by mouth every 12 (twelve) hours. for 5 doses     dextromethorphan-guaiFENesin  mg/5 ml  mg/5 mL  liquid  Commonly known as: ROBITUSSIN-DM  Take 5 mLs by mouth every 6 (six) hours. for 5 days            CONTINUE taking these medications      aspirin 81 MG EC tablet  Commonly known as: ECOTRIN  Take 81 mg by mouth once daily.     carvediloL 25 MG tablet  Commonly known as: COREG  Take 25 mg by mouth 2 (two) times daily with meals.     hydroCHLOROthiazide 25 MG tablet  Commonly known as: HYDRODIURIL  Take 25 mg by mouth once daily.     NIFEdipine 90 MG Tbsr  Commonly known as: ADALAT CC  Take 30 mg by mouth once daily.     olmesartan 40 MG tablet  Commonly known as: BENICAR  Take 40 mg by mouth once daily.     potassium chloride 10 MEQ Tbsr  Commonly known as: KLOR-CON  Take 10 mEq by mouth once.            STOP taking these medications      traMADoL 50 mg tablet  Commonly known as: ULTRAM              Indwelling Lines/Drains at time of discharge:   Lines/Drains/Airways       None                   Time spent on the discharge of patient: more than 35 minutes         Tyler Villavicencio MD  Department of Hospital Medicine  [facility] [place] [facility]